# Patient Record
Sex: MALE | Race: WHITE | NOT HISPANIC OR LATINO | Employment: OTHER | URBAN - METROPOLITAN AREA
[De-identification: names, ages, dates, MRNs, and addresses within clinical notes are randomized per-mention and may not be internally consistent; named-entity substitution may affect disease eponyms.]

---

## 2017-10-10 ENCOUNTER — TRANSCRIBE ORDERS (OUTPATIENT)
Dept: ADMINISTRATIVE | Facility: HOSPITAL | Age: 77
End: 2017-10-10

## 2017-10-10 DIAGNOSIS — I27.20 PULMONARY HYPERTENSION (HCC): Primary | ICD-10-CM

## 2017-10-12 ENCOUNTER — HOSPITAL ENCOUNTER (OUTPATIENT)
Dept: CT IMAGING | Facility: HOSPITAL | Age: 77
Discharge: HOME | End: 2017-10-12
Attending: INTERNAL MEDICINE | Admitting: INTERNAL MEDICINE

## 2017-10-12 DIAGNOSIS — I27.20 PULMONARY HYPERTENSION (HCC): ICD-10-CM

## 2017-10-12 PROCEDURE — 71250 CT THORAX DX C-: CPT

## 2018-07-03 ENCOUNTER — TRANSCRIBE ORDERS (OUTPATIENT)
Dept: ADMINISTRATIVE | Facility: HOSPITAL | Age: 78
End: 2018-07-03

## 2018-07-03 DIAGNOSIS — D72.10 EOSINOPHILIA: Primary | ICD-10-CM

## 2018-07-06 ENCOUNTER — HOSPITAL ENCOUNTER (OUTPATIENT)
Dept: CT IMAGING | Facility: HOSPITAL | Age: 78
Discharge: HOME | End: 2018-07-06
Attending: INTERNAL MEDICINE | Admitting: INTERNAL MEDICINE

## 2018-07-06 DIAGNOSIS — D72.10 EOSINOPHILIA: ICD-10-CM

## 2018-07-06 LAB — CREAT BLDA-MCNC: 0.9 MG/DL (ref 0.6–1.3)

## 2018-07-06 PROCEDURE — 82565 ASSAY OF CREATININE: CPT

## 2018-07-06 PROCEDURE — 71260 CT THORAX DX C+: CPT

## 2018-07-06 PROCEDURE — 74177 CT ABD & PELVIS W/CONTRAST: CPT

## 2018-07-06 PROCEDURE — 25010000002 IOPAMIDOL 61 % SOLUTION: Performed by: INTERNAL MEDICINE

## 2018-07-06 RX ADMIN — IOPAMIDOL 85 ML: 612 INJECTION, SOLUTION INTRAVENOUS at 11:45

## 2018-07-17 RX ORDER — SUCRALFATE ORAL 1 G/10ML
1 SUSPENSION ORAL 4 TIMES DAILY
Qty: 420 ML | Refills: 1 | Status: SHIPPED | OUTPATIENT
Start: 2018-07-17 | End: 2019-06-06 | Stop reason: SDUPTHER

## 2018-08-13 ENCOUNTER — HOSPITAL ENCOUNTER (EMERGENCY)
Facility: HOSPITAL | Age: 78
Discharge: HOME | End: 2018-08-13
Attending: EMERGENCY MEDICINE | Admitting: EMERGENCY MEDICINE

## 2018-08-13 ENCOUNTER — APPOINTMENT (OUTPATIENT)
Dept: GENERAL RADIOLOGY | Facility: HOSPITAL | Age: 78
End: 2018-08-13

## 2018-08-13 VITALS
SYSTOLIC BLOOD PRESSURE: 141 MMHG | OXYGEN SATURATION: 99 % | DIASTOLIC BLOOD PRESSURE: 81 MMHG | RESPIRATION RATE: 18 BRPM | TEMPERATURE: 98.7 F | WEIGHT: 165 LBS | HEIGHT: 69 IN | HEART RATE: 74 BPM | BODY MASS INDEX: 24.44 KG/M2

## 2018-08-13 DIAGNOSIS — N28.9 ACUTE RENAL INSUFFICIENCY: ICD-10-CM

## 2018-08-13 DIAGNOSIS — J18.9 PNEUMONIA OF LEFT LOWER LOBE DUE TO INFECTIOUS ORGANISM: Primary | ICD-10-CM

## 2018-08-13 LAB
ALBUMIN SERPL-MCNC: 3.8 G/DL (ref 3.5–5.2)
ALBUMIN/GLOB SERPL: 1.4 G/DL
ALP SERPL-CCNC: 50 U/L (ref 39–117)
ALT SERPL W P-5'-P-CCNC: 27 U/L (ref 1–41)
ANION GAP SERPL CALCULATED.3IONS-SCNC: 9.3 MMOL/L
AST SERPL-CCNC: 23 U/L (ref 1–40)
BACTERIA UR QL AUTO: ABNORMAL /HPF
BASOPHILS # BLD AUTO: 0.06 10*3/MM3 (ref 0–0.2)
BASOPHILS NFR BLD AUTO: 0.5 % (ref 0–1.5)
BILIRUB SERPL-MCNC: 0.4 MG/DL (ref 0.1–1.2)
BILIRUB UR QL STRIP: NEGATIVE
BUN BLD-MCNC: 15 MG/DL (ref 8–23)
BUN/CREAT SERPL: 10.1 (ref 7–25)
CALCIUM SPEC-SCNC: 8.6 MG/DL (ref 8.6–10.5)
CHLORIDE SERPL-SCNC: 99 MMOL/L (ref 98–107)
CLARITY UR: CLEAR
CO2 SERPL-SCNC: 28.7 MMOL/L (ref 22–29)
COLOR UR: YELLOW
CREAT BLD-MCNC: 1.48 MG/DL (ref 0.76–1.27)
D-LACTATE SERPL-SCNC: 1.2 MMOL/L (ref 0.5–2)
DEPRECATED RDW RBC AUTO: 55.2 FL (ref 37–54)
EOSINOPHIL # BLD AUTO: 0.78 10*3/MM3 (ref 0–0.7)
EOSINOPHIL NFR BLD AUTO: 6.9 % (ref 0.3–6.2)
ERYTHROCYTE [DISTWIDTH] IN BLOOD BY AUTOMATED COUNT: 15.6 % (ref 11.5–14.5)
GFR SERPL CREATININE-BSD FRML MDRD: 46 ML/MIN/1.73
GLOBULIN UR ELPH-MCNC: 2.7 GM/DL
GLUCOSE BLD-MCNC: 102 MG/DL (ref 65–99)
GLUCOSE UR STRIP-MCNC: NEGATIVE MG/DL
HCT VFR BLD AUTO: 41.2 % (ref 40.4–52.2)
HGB BLD-MCNC: 13.1 G/DL (ref 13.7–17.6)
HGB UR QL STRIP.AUTO: NEGATIVE
HOLD SPECIMEN: NORMAL
HOLD SPECIMEN: NORMAL
HYALINE CASTS UR QL AUTO: ABNORMAL /LPF
IMM GRANULOCYTES # BLD: 0.03 10*3/MM3 (ref 0–0.03)
IMM GRANULOCYTES NFR BLD: 0.3 % (ref 0–0.5)
KETONES UR QL STRIP: NEGATIVE
LEUKOCYTE ESTERASE UR QL STRIP.AUTO: ABNORMAL
LYMPHOCYTES # BLD AUTO: 1.55 10*3/MM3 (ref 0.9–4.8)
LYMPHOCYTES NFR BLD AUTO: 13.8 % (ref 19.6–45.3)
MCH RBC QN AUTO: 30.4 PG (ref 27–32.7)
MCHC RBC AUTO-ENTMCNC: 31.8 G/DL (ref 32.6–36.4)
MCV RBC AUTO: 95.6 FL (ref 79.8–96.2)
MONOCYTES # BLD AUTO: 0.57 10*3/MM3 (ref 0.2–1.2)
MONOCYTES NFR BLD AUTO: 5.1 % (ref 5–12)
NEUTROPHILS # BLD AUTO: 8.31 10*3/MM3 (ref 1.9–8.1)
NEUTROPHILS NFR BLD AUTO: 73.7 % (ref 42.7–76)
NITRITE UR QL STRIP: NEGATIVE
NRBC BLD MANUAL-RTO: 0 /100 WBC (ref 0–0)
NT-PROBNP SERPL-MCNC: 320.5 PG/ML (ref 0–1800)
PH UR STRIP.AUTO: <=5 [PH] (ref 5–8)
PLATELET # BLD AUTO: 305 10*3/MM3 (ref 140–500)
PMV BLD AUTO: 10.8 FL (ref 6–12)
POTASSIUM BLD-SCNC: 4 MMOL/L (ref 3.5–5.2)
PROT SERPL-MCNC: 6.5 G/DL (ref 6–8.5)
PROT UR QL STRIP: NEGATIVE
RBC # BLD AUTO: 4.31 10*6/MM3 (ref 4.6–6)
RBC # UR: ABNORMAL /HPF
REF LAB TEST METHOD: ABNORMAL
SODIUM BLD-SCNC: 137 MMOL/L (ref 136–145)
SP GR UR STRIP: 1.01 (ref 1–1.03)
SQUAMOUS #/AREA URNS HPF: ABNORMAL /HPF
TROPONIN T SERPL-MCNC: <0.01 NG/ML (ref 0–0.03)
UROBILINOGEN UR QL STRIP: ABNORMAL
WBC NRBC COR # BLD: 11.27 10*3/MM3 (ref 4.5–10.7)
WBC UR QL AUTO: ABNORMAL /HPF
WHOLE BLOOD HOLD SPECIMEN: NORMAL
WHOLE BLOOD HOLD SPECIMEN: NORMAL

## 2018-08-13 PROCEDURE — 84484 ASSAY OF TROPONIN QUANT: CPT | Performed by: PHYSICIAN ASSISTANT

## 2018-08-13 PROCEDURE — 80053 COMPREHEN METABOLIC PANEL: CPT | Performed by: PHYSICIAN ASSISTANT

## 2018-08-13 PROCEDURE — 93010 ELECTROCARDIOGRAM REPORT: CPT | Performed by: INTERNAL MEDICINE

## 2018-08-13 PROCEDURE — 71046 X-RAY EXAM CHEST 2 VIEWS: CPT

## 2018-08-13 PROCEDURE — 99285 EMERGENCY DEPT VISIT HI MDM: CPT

## 2018-08-13 PROCEDURE — 83605 ASSAY OF LACTIC ACID: CPT | Performed by: PHYSICIAN ASSISTANT

## 2018-08-13 PROCEDURE — 81001 URINALYSIS AUTO W/SCOPE: CPT | Performed by: PHYSICIAN ASSISTANT

## 2018-08-13 PROCEDURE — 85025 COMPLETE CBC W/AUTO DIFF WBC: CPT | Performed by: PHYSICIAN ASSISTANT

## 2018-08-13 PROCEDURE — 83880 ASSAY OF NATRIURETIC PEPTIDE: CPT | Performed by: PHYSICIAN ASSISTANT

## 2018-08-13 PROCEDURE — 93005 ELECTROCARDIOGRAM TRACING: CPT

## 2018-08-13 PROCEDURE — 93005 ELECTROCARDIOGRAM TRACING: CPT | Performed by: EMERGENCY MEDICINE

## 2018-08-13 PROCEDURE — 96360 HYDRATION IV INFUSION INIT: CPT

## 2018-08-13 RX ORDER — LEVOFLOXACIN 750 MG/1
750 TABLET ORAL ONCE
Status: COMPLETED | OUTPATIENT
Start: 2018-08-13 | End: 2018-08-13

## 2018-08-13 RX ORDER — LEVOFLOXACIN 500 MG/1
500 TABLET, FILM COATED ORAL DAILY
Qty: 6 TABLET | Refills: 0 | Status: SHIPPED | OUTPATIENT
Start: 2018-08-13 | End: 2022-05-16

## 2018-08-13 RX ADMIN — SODIUM CHLORIDE 1000 ML: 9 INJECTION, SOLUTION INTRAVENOUS at 20:57

## 2018-08-13 RX ADMIN — LEVOFLOXACIN 750 MG: 750 TABLET, FILM COATED ORAL at 20:59

## 2018-08-13 NOTE — ED TRIAGE NOTES
Pt arrives with c/o SOA, CP, that occurred once this afternoon. Pt is not currently complaining of pain at this time.

## 2018-08-13 NOTE — ED PROVIDER NOTES
EMERGENCY DEPARTMENT ENCOUNTER    Room Number:  25/25  Date seen:  8/13/2018  Time seen: 6:19 PM  PCP: Willy Almeida MD    HPI:  Chief complaint: feeling unwell  Context:Willy Saenz is a 78 y.o. male who presents to the ED with c/o 1 week hx of productive cough and feeling unwell. Today had an episode while at rest where he felt drained, clammy, and nauseated. Denies chest pain, abdominal pain, fevers, lightheadedness, urinary symptoms.    Onset: gradual  Location: generalized  Radiation: none  Duration: 1 week  Timing: intermittent  Character: fatigue  Aggravating Factors: none  Alleviating Factors: none  Severity: moderate to severe      ALLERGIES  Dust mite extract and Grass    PAST MEDICAL HISTORY  Active Ambulatory Problems     Diagnosis Date Noted   • No Active Ambulatory Problems     Resolved Ambulatory Problems     Diagnosis Date Noted   • No Resolved Ambulatory Problems     Past Medical History:   Diagnosis Date   • Back pain    • Depression    • Disease of thyroid gland    • Hyperlipidemia    • Prostate cancer (CMS/HCC)        PAST SURGICAL HISTORY  Past Surgical History:   Procedure Laterality Date   • PROSTATECTOMY         FAMILY HISTORY  History reviewed. No pertinent family history.    SOCIAL HISTORY  Social History     Social History   • Marital status:      Spouse name: N/A   • Number of children: N/A   • Years of education: N/A     Occupational History   • Not on file.     Social History Main Topics   • Smoking status: Former Smoker   • Smokeless tobacco: Not on file   • Alcohol use Yes      Comment: 2 drinks daily   • Drug use: No   • Sexual activity: Defer     Other Topics Concern   • Not on file     Social History Narrative   • No narrative on file       REVIEW OF SYSTEMS  Review of Systems   Constitutional: Negative for chills and fever.   HENT: Negative.    Eyes: Negative.    Respiratory: Positive for cough. Negative for shortness of breath.    Cardiovascular: Negative for  chest pain, palpitations and leg swelling.   Gastrointestinal: Positive for nausea. Negative for abdominal pain, diarrhea and vomiting.   Genitourinary: Negative.  Negative for dysuria and hematuria.   Musculoskeletal: Positive for myalgias.   Skin: Negative.    Neurological: Negative.    Psychiatric/Behavioral: Negative.        PHYSICAL EXAM  ED Triage Vitals   Temp Heart Rate Resp BP SpO2   08/13/18 1703 08/13/18 1703 08/13/18 1801 08/13/18 1703 08/13/18 1703   98.7 °F (37.1 °C) 88 16 136/88 100 %      Temp src Heart Rate Source Patient Position BP Location FiO2 (%)   08/13/18 1703 -- -- -- --   Tympanic         Physical Exam   Constitutional: He is oriented to person, place, and time and well-developed, well-nourished, and in no distress.   HENT:   Head: Normocephalic and atraumatic.   Right Ear: External ear normal.   Left Ear: External ear normal.   Nose: Nose normal.   Mouth/Throat: Oropharynx is clear and moist.   Eyes: Conjunctivae are normal.   Neck: Normal range of motion.   Cardiovascular: Normal rate and regular rhythm.    Pulmonary/Chest: Effort normal and breath sounds normal.   Abdominal: Soft. He exhibits no distension. There is no tenderness.   Musculoskeletal: Normal range of motion.   Neurological: He is alert and oriented to person, place, and time.   Skin: Skin is warm and dry.   Psychiatric: Affect normal.   Nursing note and vitals reviewed.      LAB RESULTS  Recent Results (from the past 24 hour(s))   Light Blue Top    Collection Time: 08/13/18  6:13 PM   Result Value Ref Range    Extra Tube hold for add-on    Green Top (Gel)    Collection Time: 08/13/18  6:13 PM   Result Value Ref Range    Extra Tube Hold for add-ons.    Lavender Top    Collection Time: 08/13/18  6:13 PM   Result Value Ref Range    Extra Tube hold for add-on    Gold Top - SST    Collection Time: 08/13/18  6:13 PM   Result Value Ref Range    Extra Tube Hold for add-ons.    Comprehensive Metabolic Panel    Collection Time:  08/13/18  6:13 PM   Result Value Ref Range    Glucose 102 (H) 65 - 99 mg/dL    BUN 15 8 - 23 mg/dL    Creatinine 1.48 (H) 0.76 - 1.27 mg/dL    Sodium 137 136 - 145 mmol/L    Potassium 4.0 3.5 - 5.2 mmol/L    Chloride 99 98 - 107 mmol/L    CO2 28.7 22.0 - 29.0 mmol/L    Calcium 8.6 8.6 - 10.5 mg/dL    Total Protein 6.5 6.0 - 8.5 g/dL    Albumin 3.80 3.50 - 5.20 g/dL    ALT (SGPT) 27 1 - 41 U/L    AST (SGOT) 23 1 - 40 U/L    Alkaline Phosphatase 50 39 - 117 U/L    Total Bilirubin 0.4 0.1 - 1.2 mg/dL    eGFR Non African Amer 46 (L) >60 mL/min/1.73    Globulin 2.7 gm/dL    A/G Ratio 1.4 g/dL    BUN/Creatinine Ratio 10.1 7.0 - 25.0    Anion Gap 9.3 mmol/L   Troponin    Collection Time: 08/13/18  6:13 PM   Result Value Ref Range    Troponin T <0.010 0.000 - 0.030 ng/mL   CBC Auto Differential    Collection Time: 08/13/18  6:13 PM   Result Value Ref Range    WBC 11.27 (H) 4.50 - 10.70 10*3/mm3    RBC 4.31 (L) 4.60 - 6.00 10*6/mm3    Hemoglobin 13.1 (L) 13.7 - 17.6 g/dL    Hematocrit 41.2 40.4 - 52.2 %    MCV 95.6 79.8 - 96.2 fL    MCH 30.4 27.0 - 32.7 pg    MCHC 31.8 (L) 32.6 - 36.4 g/dL    RDW 15.6 (H) 11.5 - 14.5 %    RDW-SD 55.2 (H) 37.0 - 54.0 fl    MPV 10.8 6.0 - 12.0 fL    Platelets 305 140 - 500 10*3/mm3    Neutrophil % 73.7 42.7 - 76.0 %    Lymphocyte % 13.8 (L) 19.6 - 45.3 %    Monocyte % 5.1 5.0 - 12.0 %    Eosinophil % 6.9 (H) 0.3 - 6.2 %    Basophil % 0.5 0.0 - 1.5 %    Immature Grans % 0.3 0.0 - 0.5 %    Neutrophils, Absolute 8.31 (H) 1.90 - 8.10 10*3/mm3    Lymphocytes, Absolute 1.55 0.90 - 4.80 10*3/mm3    Monocytes, Absolute 0.57 0.20 - 1.20 10*3/mm3    Eosinophils, Absolute 0.78 (H) 0.00 - 0.70 10*3/mm3    Basophils, Absolute 0.06 0.00 - 0.20 10*3/mm3    Immature Grans, Absolute 0.03 0.00 - 0.03 10*3/mm3    nRBC 0.0 0.0 - 0.0 /100 WBC   BNP    Collection Time: 08/13/18  6:13 PM   Result Value Ref Range    proBNP 320.5 0.0-1,800.0 pg/mL   Urinalysis With Microscopic If Indicated (No Culture) - Urine, Clean  "Catch    Collection Time: 08/13/18  7:34 PM   Result Value Ref Range    Color, UA Yellow Yellow, Straw    Appearance, UA Clear Clear    pH, UA <=5.0 5.0 - 8.0    Specific Gravity, UA 1.013 1.005 - 1.030    Glucose, UA Negative Negative    Ketones, UA Negative Negative    Bilirubin, UA Negative Negative    Blood, UA Negative Negative    Protein, UA Negative Negative    Leuk Esterase, UA Trace (A) Negative    Nitrite, UA Negative Negative    Urobilinogen, UA 1.0 E.U./dL 0.2 - 1.0 E.U./dL   Lactic Acid, Plasma    Collection Time: 08/13/18  7:34 PM   Result Value Ref Range    Lactate 1.2 0.5 - 2.0 mmol/L   Urinalysis, Microscopic Only - Urine, Clean Catch    Collection Time: 08/13/18  7:34 PM   Result Value Ref Range    RBC, UA 0-2 None Seen, 0-2 /HPF    WBC, UA 6-12 (A) None Seen, 0-2 /HPF    Bacteria, UA None Seen None Seen /HPF    Squamous Epithelial Cells, UA 0-2 None Seen, 0-2 /HPF    Hyaline Casts, UA 3-6 None Seen /LPF    Methodology Manual Light Microscopy        I ordered the above labs and reviewed the results    RADIOLOGY  XR Chest 2 View   Final Result        CXR showed LLL infiltrate      MEDICATIONS GIVEN IN ER  Medications   sodium chloride 0.9 % bolus 1,000 mL (0 mL Intravenous Stopped 8/13/18 2204)   levoFLOXacin (LEVAQUIN) tablet 750 mg (750 mg Oral Given 8/13/18 2059)       EKG  Interpreted by ED Physician    PROCEDURES  Procedures      PROGRESS AND CONSULTS    Progress Notes:        2028 Reviewed pt's history and workup with Dr. Victoria.  After a bedside evaluation; Dr Victoria agrees with the plan of care and is taking over course of care of the patient at this time.      Disposition vitals:  /81   Pulse 74   Temp 98.7 °F (37.1 °C) (Tympanic)   Resp 18   Ht 175.3 cm (69\")   Wt 74.8 kg (165 lb)   SpO2 99%   BMI 24.37 kg/m²       DIAGNOSIS  Final diagnoses:   Pneumonia of left lower lobe due to infectious organism (CMS/HCC)   Acute renal insufficiency       FOLLOW UP   Willy Almeida " MD Coco  [address]  [facility] [address]  [phone]    Go in 1 day        RX     Medication List      New Prescriptions    levoFLOXacin 500 MG tablet  Commonly known as:  LEVAQUIN  Take 1 tablet by mouth Daily.             Britney Houser PA  08/13/18 [id number]

## 2018-08-14 NOTE — DISCHARGE INSTRUCTIONS
Drink plenty fluids.  Start taking Levaquin tomorrow.  Follow-up with Dr. Almeida as scheduled.  Return to emergency department for worsening shortness of breath, fever, or other concern.

## 2018-08-14 NOTE — ED PROVIDER NOTES
"Pt presents to the ED c/o productive cough and fatigue for 1 week. Today pt had an episode where he felt nauseated, \"clammy\", and weak. Pt c/o SOA, dizziness, and decreased appetite. Pt denies CP, abd pain, fever, vomiting, diarrhea. Pt reports his BP at PCP office this morning was low. Pt started Amoxicillin 1 week ago.   On exam, pt is well appearing.  Heart and lungs are normal  Abd is normal  Neuro exam is normal    EKG           EKG time: 1707  Rhythm/Rate: NSR 62  P waves and FL: normal  QRS, axis: normal axis, RSR' in VI   ST and T waves: normal     Interpreted Contemporaneously by me, independently viewed  unchanged compared to prior 5/7/16     Progress and Consults  8:29 PM  Labs reviewed- WBC 11.27  CXR showed LLL infiltrate    8:34 PM  Informed pt of CXR which shows pneumonia in L lower lung and plan to discuss with PCP.     8:41 PM  Consult placed to PCP and IV fluids ordered for dehydration.     8:46 PM  Discussed pt case with Dr. Almeida, PCP. He agrees with plan to give IV fluids and treat with Levaquin. He will have office f/u with pt tomorrow.   Levaquin ordered for pneumonia.     10:11 PM  Rechecked pt who is resting in NAD. Informed pt of plan to discharge with rx for Levaquin. Advised pt f/u with Dr. Almeida. Pt understands and agrees with the plan, all questions answered. RTER instructions given.     ED Course as of Aug 13 2217   Mon Aug 13, 2018   2036 0.9 one month ago Creatinine: (!) 1.48 [WH]   2214 Test results were discussed with the patient and his wife.  I also informed him that I spoke with Dr. Almeida at the plan is to discharge him with prescription for Levaquin.  He already has an appointment with Dr. Almeida tomorrow morning.  Patient was resting comfortably.  O2 sats were 99% on room air.  Blood pressure was 141/81.  [WH]      ED Course User Index  [WH] Braxton Victoria MD       Final diagnoses:   Pneumonia of left lower lobe due to infectious organism (CMS/HCC)   Acute renal " insufficiency     DISCHARGE    Patient discharged in stable condition.    Reviewed implications of results, diagnosis, meds, responsibility to follow up, warning signs and symptoms of possible worsening, potential complications and reasons to return to ER.    Patient/Family voiced understanding of above instructions.    Discussed plan for discharge, as there is no emergent indication for admission. Patient referred to primary care provider for BP management due to today's BP. Pt/family is agreeable and understands need for follow up and repeat testing.  Pt is aware that discharge does not mean that nothing is wrong but it indicates no emergency is present that requires admission and they must continue care with follow-up as given below or physician of their choice.     FOLLOW-UP  Willy Almeida MD  [address] [id number] [phone]    Go in 1 day           Medication List      New Prescriptions    levoFLOXacin 500 MG tablet  Commonly known as:  LEVAQUIN  Take 1 tablet by mouth Daily.            Attestation:  The ARIES and I have discussed this patient's history, physical exam, and treatment plan.  I have reviewed the documentation and personally had a face to face interaction with the patient. I affirm the documentation and agree with the treatment and plan.  The attached note describes my personal findings.      Documentation assistance provided by leanne Orlando for Dr. Victoria Information recorded by the leanne was done at my direction and has been verified and validated by me.       Janett Orlando  08/13/18 [id number]       Braxton Victoria MD  08/14/18 0001

## 2018-09-17 ENCOUNTER — TRANSCRIBE ORDERS (OUTPATIENT)
Dept: ADMINISTRATIVE | Facility: HOSPITAL | Age: 78
End: 2018-09-17

## 2018-09-17 DIAGNOSIS — R53.83 FATIGUE, UNSPECIFIED TYPE: ICD-10-CM

## 2018-09-17 DIAGNOSIS — R41.82 ALTERED MENTAL STATUS, UNSPECIFIED ALTERED MENTAL STATUS TYPE: Primary | ICD-10-CM

## 2018-09-17 DIAGNOSIS — R42 DIZZINESS: ICD-10-CM

## 2019-06-06 ENCOUNTER — OFFICE VISIT (OUTPATIENT)
Dept: NEUROLOGY | Facility: CLINIC | Age: 79
End: 2019-06-06

## 2019-06-06 VITALS
WEIGHT: 174.3 LBS | SYSTOLIC BLOOD PRESSURE: 120 MMHG | HEART RATE: 62 BPM | OXYGEN SATURATION: 96 % | BODY MASS INDEX: 25.82 KG/M2 | HEIGHT: 69 IN | DIASTOLIC BLOOD PRESSURE: 66 MMHG

## 2019-06-06 DIAGNOSIS — R90.89 ABNORMAL FINDING ON MRI OF BRAIN: Primary | ICD-10-CM

## 2019-06-06 PROCEDURE — 99203 OFFICE O/P NEW LOW 30 MIN: CPT | Performed by: PSYCHIATRY & NEUROLOGY

## 2019-06-06 RX ORDER — ALPRAZOLAM 0.5 MG/1
TABLET ORAL
COMMUNITY

## 2019-06-06 RX ORDER — TRAMADOL HYDROCHLORIDE 50 MG/1
TABLET ORAL
COMMUNITY

## 2019-06-06 RX ORDER — CARVEDILOL 6.25 MG/1
6.25 TABLET ORAL 2 TIMES DAILY
Refills: 2 | COMMUNITY
Start: 2019-04-15

## 2019-06-06 RX ORDER — TRIAMCINOLONE ACETONIDE 0.25 MG/G
OINTMENT TOPICAL
Status: ON HOLD | COMMUNITY
End: 2022-07-24

## 2019-06-06 RX ORDER — TESTOSTERONE CYPIONATE 200 MG/ML
VIAL (ML) INTRAMUSCULAR
COMMUNITY

## 2019-06-06 NOTE — PROGRESS NOTES
"Willy Saenz is a 79 y.o. male presents for   Chief Complaint   Patient presents with   • Neurologic Problem                CHIEF COMPLAINT:  Abnormal Brain MRI      History of Present Illness  A pleasant 79 YOM, right-handed, comes unaccompanied who ha sno Neurological compalints  He filled out the history Neurology sheet with the only complaint \"appetite change\"  Back In August-Sepetmebr 2018 he was evaluated for fatigue and chronic fatigue syndrome was considered. He was also have non-vertigo dizziness. DR Almeida had sent him a addy MRI that was done at [facility] in September 2018.   Dio went to [facility] where he was evaluated and was felt not to have neurological issues and no chronic fatigue.  He has since recovered.          The following portions of the patient's history were reviewed and updated as appropriate: allergies, current medications, past family history, past medical history, past social history, past surgical history and problem list.  I compleetd ROS, PH, SH, FH, medications and allergies      Review of Systems   Constitutional: Positive for appetite change. Negative for fatigue and fever.   HENT: Negative for hearing loss, tinnitus and trouble swallowing.    Eyes: Negative for pain, redness and itching.   Respiratory: Negative for cough, shortness of breath and wheezing.    Cardiovascular: Negative for chest pain, palpitations and leg swelling.   Gastrointestinal: Negative for constipation, diarrhea, nausea and vomiting.   Endocrine: Negative for cold intolerance, heat intolerance and polydipsia.   Genitourinary: Negative for decreased urine volume, difficulty urinating and urgency.   Musculoskeletal: Negative for back pain, neck pain and neck stiffness.   Skin: Negative for color change, rash and wound.   Allergic/Immunologic: Negative for environmental allergies, food allergies and immunocompromised state.   Neurological: Negative for dizziness, tremors, seizures, syncope, " facial asymmetry, speech difficulty, weakness, light-headedness, numbness and headaches.   Hematological: Negative for adenopathy. Does not bruise/bleed easily.   Psychiatric/Behavioral: Negative for agitation, behavioral problems, confusion, decreased concentration, dysphoric mood, hallucinations, self-injury, sleep disturbance and suicidal ideas. The patient is nervous/anxious. The patient is not hyperactive.          Past Medical History:   Diagnosis Date   • Anxiety    • Arthritis    • Back pain    • Depression    • Disease of thyroid gland    • Environmental allergies    • Hyperlipidemia    • Prostate cancer (CMS/[facility])          Social History     Socioeconomic History   • Marital status:      Spouse name: Not on file   • Number of children: Not on file   • Years of education: Not on file   • Highest education level: Not on file   Tobacco Use   • Smoking status: Former Smoker     Start date:      Last attempt to quit: 1970     Years since quittin.4   • Smokeless tobacco: Never Used   Substance and Sexual Activity   • Alcohol use: Yes     Comment: 2 drinks daily   • Drug use: No   • Sexual activity: Defer          Family History   Problem Relation Age of Onset   • No Known Problems Mother    • Arthritis Father    • Cancer Father    • Heart disease Father    • Parkinsonism Father    • No Known Problems Maternal Grandmother    • No Known Problems Maternal Grandfather    • No Known Problems Paternal Grandmother    • No Known Problems Paternal Grandfather            Current Outpatient Medications:   •  ALPRAZolam (XANAX) 0.5 MG tablet, alprazolam 0.5 mg tablet  TAKE 1 TABLET EVERY DAY FOR ANXIETY ATTACK, Disp: , Rfl:   •  aspirin 325 MG tablet, Take 325 mg by mouth daily., Disp: , Rfl:   •  carvedilol (COREG) 6.25 MG tablet, Take 6.25 mg by mouth 2 (Two) Times a Day., Disp: , Rfl: 2  •  citalopram (CELEXA) 20 MG tablet, Take  by mouth., Disp: , Rfl:   •  ezetimibe (ZETIA) 10 MG tablet, Take 10 mg by  mouth daily., Disp: , Rfl:   •  levocetirizine (XYZAL) 5 MG tablet, Take 5 mg by mouth every evening., Disp: , Rfl:   •  levothyroxine (SYNTHROID, LEVOTHROID) 50 MCG tablet, Take  by mouth., Disp: , Rfl:   •  OMEPRAZOLE PO, omeprazole 20 mg-clarithromycin 500 mg-amoxicill 500 mg(40) combo pack  Take by oral route., Disp: , Rfl:   •  rosuvastatin (CRESTOR) 40 MG tablet, Take 40 mg by mouth Daily., Disp: , Rfl:   •  Testosterone Cypionate 200 MG/ML solution, testosterone cypionate 200 mg/mL intramuscular oil, Disp: , Rfl:   •  traMADol (ULTRAM) 50 MG tablet, tramadol 50 mg tablet  Take 2 tablets 3 times a day by oral route., Disp: , Rfl:   •  triamcinolone (KENALOG) 0.025 % ointment, triamcinolone acetonide 0.025 % topical ointment, Disp: , Rfl:   •  Beclomethasone Diprop, Nasal, (QNASL NA), into each nostril., Disp: , Rfl:   •  carbamide peroxide (DEBROX) 6.5 % otic solution, Debrox 6.5 % ear drops  INSTILL 5 DROPS INTO AFFECTED EAR(S) BY OTIC ROUTE 2 TIMES PER DAY, Disp: , Rfl:   •  diclofenac (VOLTAREN) 1 % gel gel, diclofenac 1 % topical gel, Disp: , Rfl:   •  levoFLOXacin (LEVAQUIN) 500 MG tablet, Take 1 tablet by mouth Daily., Disp: 6 tablet, Rfl: 0      Vitals:    06/06/19 0904   BP: 120/66   Pulse: 62   SpO2: 96%         Physical Exam   Constitutional: He is oriented to person, place, and time. He appears well-developed and well-nourished. No distress.   Neurological: He is oriented to person, place, and time. He has normal strength. He has a normal Finger-Nose-Finger Test, a normal Heel to Shin Test, a normal Romberg Test and a normal Tandem Gait Test. Gait normal.   Skin: He is not diaphoretic.   Psychiatric: He has a normal mood and affect. His speech is normal and behavior is normal. Judgment and thought content normal.   Nursing note and vitals reviewed.        Neurologic Exam     Mental Status   Oriented to person, place, and time.   Attention: normal. Concentration: normal.   Speech: speech is normal    Level of consciousness: alert  Knowledge: good.     Cranial Nerves   Cranial nerves II through XII intact.     Motor Exam   Muscle bulk: normal  Overall muscle tone: normal  Right arm tone: normal  Left arm tone: normal  Right arm pronator drift: absent  Left arm pronator drift: absent  Right leg tone: normal  Left leg tone: normal    Strength   Strength 5/5 throughout.     Sensory Exam   Graphesthesia: normal  Stereognosis: normal    Gait, Coordination, and Reflexes     Gait  Gait: normal    Coordination   Romberg: negative  Finger to nose coordination: normal  Heel to shin coordination: normal  Tandem walking coordination: normal    Tremor   Resting tremor: absent  Intention tremor: absent  Action tremor: absent    Reflexes   Right Roblero: absent  Left Roblero: absent  Right ankle clonus: absent  Left ankle clonus: absent  Right pendular knee jerk: absent  Left pendular knee jerk: absent          No visits with results within 2 Month(s) from this visit.   Latest known visit with results is:   Admission on 08/13/2018, Discharged on 08/13/2018   Component Date Value Ref Range Status   • Extra Tube 08/13/2018 hold for add-on   Final    Auto resulted   • Extra Tube 08/13/2018 Hold for add-ons.   Final    Auto resulted.   • Extra Tube 08/13/2018 hold for add-on   Final    Auto resulted   • Extra Tube 08/13/2018 Hold for add-ons.   Final    Auto resulted.   • Glucose 08/13/2018 102* 65 - 99 mg/dL Final   • BUN 08/13/2018 15  8 - 23 mg/dL Final   • Creatinine 08/13/2018 1.48* 0.76 - 1.27 mg/dL Final   • Sodium 08/13/2018 137  136 - 145 mmol/L Final   • Potassium 08/13/2018 4.0  3.5 - 5.2 mmol/L Final   • Chloride 08/13/2018 99  98 - 107 mmol/L Final   • CO2 08/13/2018 28.7  22.0 - 29.0 mmol/L Final   • Calcium 08/13/2018 8.6  8.6 - 10.5 mg/dL Final   • Total Protein 08/13/2018 6.5  6.0 - 8.5 g/dL Final   • Albumin 08/13/2018 3.80  3.50 - 5.20 g/dL Final   • ALT (SGPT) 08/13/2018 27  1 - 41 U/L Final   • AST (SGOT)  08/13/2018 23  1 - 40 U/L Final   • Alkaline Phosphatase 08/13/2018 50  39 - 117 U/L Final   • Total Bilirubin 08/13/2018 0.4  0.1 - 1.2 mg/dL Final   • eGFR Non African Amer 08/13/2018 46* >60 mL/min/1.73 Final   • Globulin 08/13/2018 2.7  gm/dL Final   • A/G Ratio 08/13/2018 1.4  g/dL Final   • BUN/Creatinine Ratio 08/13/2018 10.1  7.0 - 25.0 Final   • Anion Gap 08/13/2018 9.3  mmol/L Final   • Color, UA 08/13/2018 Yellow  Yellow, Straw Final   • Appearance, UA 08/13/2018 Clear  Clear Final   • pH, UA 08/13/2018 <=5.0  5.0 - 8.0 Final   • Specific Gravity, UA 08/13/2018 1.013  1.005 - 1.030 Final   • Glucose, UA 08/13/2018 Negative  Negative Final   • Ketones, UA 08/13/2018 Negative  Negative Final   • Bilirubin, UA 08/13/2018 Negative  Negative Final   • Blood, UA 08/13/2018 Negative  Negative Final   • Protein, UA 08/13/2018 Negative  Negative Final   • Leuk Esterase, UA 08/13/2018 Trace* Negative Final   • Nitrite, UA 08/13/2018 Negative  Negative Final   • Urobilinogen, UA 08/13/2018 1.0 E.U./dL  0.2 - 1.0 E.U./dL Final   • Troponin T 08/13/2018 <0.010  0.000 - 0.030 ng/mL Final   • Lactate 08/13/2018 1.2  0.5 - 2.0 mmol/L Final   • WBC 08/13/2018 11.27* 4.50 - 10.70 10*3/mm3 Final   • RBC 08/13/2018 4.31* 4.60 - 6.00 10*6/mm3 Final   • Hemoglobin 08/13/2018 13.1* 13.7 - 17.6 g/dL Final   • Hematocrit 08/13/2018 41.2  40.4 - 52.2 % Final   • MCV 08/13/2018 95.6  79.8 - 96.2 fL Final   • MCH 08/13/2018 30.4  27.0 - 32.7 pg Final   • MCHC 08/13/2018 31.8* 32.6 - 36.4 g/dL Final   • RDW 08/13/2018 15.6* 11.5 - 14.5 % Final   • RDW-SD 08/13/2018 55.2* 37.0 - 54.0 fl Final   • MPV 08/13/2018 10.8  6.0 - 12.0 fL Final   • Platelets 08/13/2018 305  140 - 500 10*3/mm3 Final   • Neutrophil % 08/13/2018 73.7  42.7 - 76.0 % Final   • Lymphocyte % 08/13/2018 13.8* 19.6 - 45.3 % Final   • Monocyte % 08/13/2018 5.1  5.0 - 12.0 % Final   • Eosinophil % 08/13/2018 6.9* 0.3 - 6.2 % Final   • Basophil % 08/13/2018 0.5  0.0 -  1.5 % Final   • Immature Grans % 08/13/2018 0.3  0.0 - 0.5 % Final   • Neutrophils, Absolute 08/13/2018 8.31* 1.90 - 8.10 10*3/mm3 Final   • Lymphocytes, Absolute 08/13/2018 1.55  0.90 - 4.80 10*3/mm3 Final   • Monocytes, Absolute 08/13/2018 0.57  0.20 - 1.20 10*3/mm3 Final   • Eosinophils, Absolute 08/13/2018 0.78* 0.00 - 0.70 10*3/mm3 Final   • Basophils, Absolute 08/13/2018 0.06  0.00 - 0.20 10*3/mm3 Final   • Immature Grans, Absolute 08/13/2018 0.03  0.00 - 0.03 10*3/mm3 Final   • nRBC 08/13/2018 0.0  0.0 - 0.0 /100 WBC Final   • RBC, UA 08/13/2018 0-2  None Seen, 0-2 /HPF Final   • WBC, UA 08/13/2018 6-12* None Seen, 0-2 /HPF Final   • Bacteria, UA 08/13/2018 None Seen  None Seen /HPF Final   • Squamous Epithelial Cells, UA 08/13/2018 0-2  None Seen, 0-2 /HPF Final   • Hyaline Casts, UA 08/13/2018 3-6  None Seen /LPF Final   • Methodology 08/13/2018 Manual Light Microscopy   Final   • proBNP 08/13/2018 320.5  0.0-1,800.0 pg/mL Final            REVIEW OF STUDIES:  WE have the reprot from his Septemebr 2018 MRI  With the patient by my side we went over the report line by line and explained the report to him.  In order to put his mind at ease I called [facility] and Dr Willy Marks returned my call: the abnormlaity relative to the lowewr medulla he feels is a normal variant and there is nothing to be concerned about.      DIAGNOSIS, IMPRESSION AND PLAN:  Normal Neurological Exam     I reviewed my findings with the aptient  There is no need for further evaluation relating to his addy MRI at the prsent time.

## 2019-08-20 ENCOUNTER — TRANSCRIBE ORDERS (OUTPATIENT)
Dept: ADMINISTRATIVE | Facility: HOSPITAL | Age: 79
End: 2019-08-20

## 2019-08-20 DIAGNOSIS — N18.9 CHRONIC KIDNEY DISEASE, UNSPECIFIED CKD STAGE: Primary | ICD-10-CM

## 2019-08-29 ENCOUNTER — APPOINTMENT (OUTPATIENT)
Dept: CARDIOLOGY | Facility: HOSPITAL | Age: 79
End: 2019-08-29

## 2019-08-29 ENCOUNTER — APPOINTMENT (OUTPATIENT)
Dept: ULTRASOUND IMAGING | Facility: HOSPITAL | Age: 79
End: 2019-08-29

## 2019-09-03 ENCOUNTER — HOSPITAL ENCOUNTER (OUTPATIENT)
Dept: ULTRASOUND IMAGING | Facility: HOSPITAL | Age: 79
Discharge: HOME | End: 2019-09-03

## 2019-09-03 ENCOUNTER — HOSPITAL ENCOUNTER (OUTPATIENT)
Dept: CARDIOLOGY | Facility: HOSPITAL | Age: 79
Discharge: HOME | End: 2019-09-03
Admitting: INTERNAL MEDICINE

## 2019-09-03 DIAGNOSIS — N18.9 CHRONIC KIDNEY DISEASE, UNSPECIFIED CKD STAGE: ICD-10-CM

## 2019-09-03 LAB
BH CV ECHO MEAS - DIST REN A EDV LEFT: 18 CM/SEC
BH CV ECHO MEAS - DIST REN A PSV LEFT: 59.6 CM/SEC
BH CV ECHO MEAS - DIST REN A RI LEFT: 0.7
BH CV ECHO MEAS - HILAR A EDV LEFT: 6.7 CM/SEC
BH CV ECHO MEAS - HILAR A PSV LEFT: 22.9 CM/SEC
BH CV ECHO MEAS - HILAR A RI LEFT: 0.71
BH CV ECHO MEAS - MID REN A EDV LEFT: 16.4 CM/SEC
BH CV ECHO MEAS - MID REN A PSV LEFT: 56.6 CM/SEC
BH CV ECHO MEAS - MID REN A RI LEFT: 0.71
BH CV ECHO MEAS - PROX REN A EDV LEFT: 21 CM/SEC
BH CV ECHO MEAS - PROX REN A PSV LEFT: 75.7 CM/SEC
BH CV ECHO MEAS - PROX REN A RI LEFT: 0.72
BH CV VAS BP LEFT ARM: NORMAL MMHG
BH CV VAS BP RIGHT ARM: NORMAL MMHG
BH CV VAS RENAL AORTIC MID PSV: 53.6 CM/S
BH CV VAS RENAL HILUM LEFT EDV: 6.7 CM/S
BH CV VAS RENAL HILUM LEFT PSV: 22.9 CM/S
BH CV VAS RENAL HILUM RIGHT EDV: 13.6 CM/S
BH CV VAS RENAL HILUM RIGHT PSV: 47.1 CM/S
BH CV XLRA MEAS - KID L LEFT: 11.3 CM
BH CV XLRA MEAS - RENAL A ORG RI LEFT: 0.71
BH CV XLRA MEAS DIST REN A EDV RIGHT: 12.5 CM/S
BH CV XLRA MEAS DIST REN A PSV RIGHT: 51.6 CM/S
BH CV XLRA MEAS HILAR A EDV RIGHT: 13.6 CM/SEC
BH CV XLRA MEAS HILAR A PSV RIGHT: 47.1 CM/SEC
BH CV XLRA MEAS HILAR A RI RIGHT: 0.71
BH CV XLRA MEAS KID L RIGHT: 10.7 CM
BH CV XLRA MEAS KID W RIGHT: 5.5 CM
BH CV XLRA MEAS MID REN A EDV RIGHT: 20.8 CM/SEC
BH CV XLRA MEAS MID REN A PSV RIGHT: 78.6 CM/SEC
BH CV XLRA MEAS MID REN A RI RIGHT: 0.74
BH CV XLRA MEAS PROX REN A EDV RIGHT: 18.7 CM/SEC
BH CV XLRA MEAS PROX REN A PSV RIGHT: 72.1 CM/SEC
BH CV XLRA MEAS PROX REN A RI RIGHT: 0.74
BH CV XLRA MEAS RAR LEFT: 1.4
BH CV XLRA MEAS RAR RIGHT: 1.7
BH CV XLRA MEAS RENAL A ORG EDV LEFT: 17.4 CM/SEC
BH CV XLRA MEAS RENAL A ORG EDV RIGHT: 24.3 CM/SEC
BH CV XLRA MEAS RENAL A ORG PSV LEFT: 60.3 CM/SEC
BH CV XLRA MEAS RENAL A ORG PSV RIGHT: 90.7 CM/SEC
BH CV XLRA MEAS RENAL A ORG RI RIGHT: 0.73
LEFT KIDNEY WIDTH: 5.5 CM
LEFT RENAL UPPER PARENCHYMA MAX: 15.3 CM/S
LEFT RENAL UPPER PARENCHYMA MIN: 5.2 CM/S
LEFT RENAL UPPER PARENCHYMA RI: 0.66
RIGHT RENAL UPPER PARENCHYMA MAX: 18.3 CM/S
RIGHT RENAL UPPER PARENCHYMA MIN: 6.22 CM/S
RIGHT RENAL UPPER PARENCHYMA RI: 0.66

## 2019-09-03 PROCEDURE — 93975 VASCULAR STUDY: CPT

## 2019-09-03 PROCEDURE — 76775 US EXAM ABDO BACK WALL LIM: CPT

## 2020-06-23 ENCOUNTER — TRANSCRIBE ORDERS (OUTPATIENT)
Dept: ADMINISTRATIVE | Facility: HOSPITAL | Age: 80
End: 2020-06-23

## 2020-06-23 DIAGNOSIS — R10.9 ABDOMINAL PAIN, UNSPECIFIED ABDOMINAL LOCATION: Primary | ICD-10-CM

## 2020-06-23 DIAGNOSIS — R10.9 ABDOMINAL CRAMPING: ICD-10-CM

## 2020-06-23 DIAGNOSIS — R11.0 NAUSEA: ICD-10-CM

## 2020-06-25 ENCOUNTER — HOSPITAL ENCOUNTER (OUTPATIENT)
Dept: CT IMAGING | Facility: HOSPITAL | Age: 80
Discharge: HOME | End: 2020-06-25
Admitting: INTERNAL MEDICINE

## 2020-06-25 DIAGNOSIS — R11.0 NAUSEA: ICD-10-CM

## 2020-06-25 DIAGNOSIS — R10.9 ABDOMINAL PAIN, UNSPECIFIED ABDOMINAL LOCATION: ICD-10-CM

## 2020-06-25 DIAGNOSIS — R10.9 ABDOMINAL CRAMPING: ICD-10-CM

## 2020-06-25 PROCEDURE — 74176 CT ABD & PELVIS W/O CONTRAST: CPT

## 2020-06-25 PROCEDURE — 0 DIATRIZOATE MEGLUMINE & SODIUM PER 1 ML: Performed by: INTERNAL MEDICINE

## 2020-06-25 RX ADMIN — DIATRIZOATE MEGLUMINE AND DIATRIZOATE SODIUM 30 ML: 660; 100 LIQUID ORAL; RECTAL at 09:46

## 2020-07-09 ENCOUNTER — TRANSCRIBE ORDERS (OUTPATIENT)
Dept: SLEEP MEDICINE | Facility: HOSPITAL | Age: 80
End: 2020-07-09

## 2020-07-09 ENCOUNTER — PREP FOR SURGERY (OUTPATIENT)
Dept: OTHER | Facility: HOSPITAL | Age: 80
End: 2020-07-09

## 2020-07-09 DIAGNOSIS — Z01.818 OTHER SPECIFIED PRE-OPERATIVE EXAMINATION: Primary | ICD-10-CM

## 2020-07-09 DIAGNOSIS — R93.3 ABNORMAL CT SCAN, SIGMOID COLON: ICD-10-CM

## 2020-07-09 DIAGNOSIS — R19.7 DIARRHEA: Primary | ICD-10-CM

## 2020-07-09 DIAGNOSIS — R10.13 EPIGASTRIC PAIN: ICD-10-CM

## 2020-07-09 DIAGNOSIS — R93.0 FAMILIAL ENLARGEMENT OF THE SELLA TURCICA: ICD-10-CM

## 2020-07-09 DIAGNOSIS — R93.3 ABNORMAL VIRTUAL COLONOSCOPE: ICD-10-CM

## 2020-07-09 DIAGNOSIS — K52.9 COLITIS: ICD-10-CM

## 2020-07-10 ENCOUNTER — LAB (OUTPATIENT)
Dept: LAB | Facility: HOSPITAL | Age: 80
End: 2020-07-10

## 2020-07-10 DIAGNOSIS — Z01.818 OTHER SPECIFIED PRE-OPERATIVE EXAMINATION: ICD-10-CM

## 2020-07-10 PROCEDURE — U0004 COV-19 TEST NON-CDC HGH THRU: HCPCS

## 2020-07-10 PROCEDURE — C9803 HOPD COVID-19 SPEC COLLECT: HCPCS

## 2020-07-11 LAB
REF LAB TEST METHOD: NORMAL
SARS-COV-2 RNA RESP QL NAA+PROBE: NOT DETECTED

## 2020-07-13 ENCOUNTER — ANESTHESIA EVENT (OUTPATIENT)
Dept: GASTROENTEROLOGY | Facility: HOSPITAL | Age: 80
End: 2020-07-13

## 2020-07-13 ENCOUNTER — HOSPITAL ENCOUNTER (OUTPATIENT)
Facility: HOSPITAL | Age: 80
Setting detail: HOSPITAL OUTPATIENT SURGERY
Discharge: HOME | End: 2020-07-13
Attending: SURGERY | Admitting: SURGERY

## 2020-07-13 ENCOUNTER — ANESTHESIA (OUTPATIENT)
Dept: GASTROENTEROLOGY | Facility: HOSPITAL | Age: 80
End: 2020-07-13

## 2020-07-13 VITALS
SYSTOLIC BLOOD PRESSURE: 147 MMHG | DIASTOLIC BLOOD PRESSURE: 95 MMHG | OXYGEN SATURATION: 96 % | TEMPERATURE: 98 F | HEART RATE: 74 BPM | BODY MASS INDEX: 24.05 KG/M2 | RESPIRATION RATE: 12 BRPM | HEIGHT: 70 IN | WEIGHT: 168 LBS

## 2020-07-13 DIAGNOSIS — K52.9 COLITIS: ICD-10-CM

## 2020-07-13 DIAGNOSIS — R93.3 ABNORMAL CT SCAN, SIGMOID COLON: ICD-10-CM

## 2020-07-13 DIAGNOSIS — R19.7 DIARRHEA: ICD-10-CM

## 2020-07-13 DIAGNOSIS — R10.13 EPIGASTRIC PAIN: ICD-10-CM

## 2020-07-13 PROCEDURE — 25010000002 PHENYLEPHRINE PER 1 ML: Performed by: NURSE ANESTHETIST, CERTIFIED REGISTERED

## 2020-07-13 PROCEDURE — 88305 TISSUE EXAM BY PATHOLOGIST: CPT | Performed by: SURGERY

## 2020-07-13 PROCEDURE — 25010000002 PROPOFOL 10 MG/ML EMULSION: Performed by: NURSE ANESTHETIST, CERTIFIED REGISTERED

## 2020-07-13 RX ORDER — LIDOCAINE HYDROCHLORIDE 20 MG/ML
INJECTION, SOLUTION INFILTRATION; PERINEURAL AS NEEDED
Status: DISCONTINUED | OUTPATIENT
Start: 2020-07-13 | End: 2020-07-13 | Stop reason: SURG

## 2020-07-13 RX ORDER — PROPOFOL 10 MG/ML
VIAL (ML) INTRAVENOUS AS NEEDED
Status: DISCONTINUED | OUTPATIENT
Start: 2020-07-13 | End: 2020-07-13 | Stop reason: SURG

## 2020-07-13 RX ORDER — SODIUM CHLORIDE, SODIUM LACTATE, POTASSIUM CHLORIDE, CALCIUM CHLORIDE 600; 310; 30; 20 MG/100ML; MG/100ML; MG/100ML; MG/100ML
30 INJECTION, SOLUTION INTRAVENOUS CONTINUOUS PRN
Status: DISCONTINUED | OUTPATIENT
Start: 2020-07-13 | End: 2020-07-13 | Stop reason: HOSPADM

## 2020-07-13 RX ORDER — GLYCOPYRROLATE 0.2 MG/ML
INJECTION INTRAMUSCULAR; INTRAVENOUS AS NEEDED
Status: DISCONTINUED | OUTPATIENT
Start: 2020-07-13 | End: 2020-07-13 | Stop reason: SURG

## 2020-07-13 RX ORDER — PROPOFOL 10 MG/ML
VIAL (ML) INTRAVENOUS CONTINUOUS PRN
Status: DISCONTINUED | OUTPATIENT
Start: 2020-07-13 | End: 2020-07-13 | Stop reason: SURG

## 2020-07-13 RX ADMIN — GLYCOPYRROLATE 0.2 MG: 0.2 INJECTION INTRAMUSCULAR; INTRAVENOUS at 07:57

## 2020-07-13 RX ADMIN — LIDOCAINE HYDROCHLORIDE 100 MG: 20 INJECTION, SOLUTION INFILTRATION; PERINEURAL at 07:57

## 2020-07-13 RX ADMIN — PHENYLEPHRINE HYDROCHLORIDE 100 MCG: 10 INJECTION INTRAVENOUS at 08:25

## 2020-07-13 RX ADMIN — PHENYLEPHRINE HYDROCHLORIDE 100 MCG: 10 INJECTION INTRAVENOUS at 08:06

## 2020-07-13 RX ADMIN — PHENYLEPHRINE HYDROCHLORIDE 100 MCG: 10 INJECTION INTRAVENOUS at 08:10

## 2020-07-13 RX ADMIN — PHENYLEPHRINE HYDROCHLORIDE 100 MCG: 10 INJECTION INTRAVENOUS at 08:28

## 2020-07-13 RX ADMIN — PROPOFOL 250 MCG/KG/MIN: 10 INJECTION, EMULSION INTRAVENOUS at 07:57

## 2020-07-13 RX ADMIN — SODIUM CHLORIDE, POTASSIUM CHLORIDE, SODIUM LACTATE AND CALCIUM CHLORIDE 30 ML/HR: 600; 310; 30; 20 INJECTION, SOLUTION INTRAVENOUS at 07:31

## 2020-07-13 RX ADMIN — PHENYLEPHRINE HYDROCHLORIDE 100 MCG: 10 INJECTION INTRAVENOUS at 08:20

## 2020-07-13 RX ADMIN — PROPOFOL 100 MG: 10 INJECTION, EMULSION INTRAVENOUS at 07:57

## 2020-07-13 NOTE — ANESTHESIA POSTPROCEDURE EVALUATION
"Patient: Willy Saenz    Procedure Summary     Date:  07/13/20 Room / Location:   [facility]    Anesthesia Start:  0751 Anesthesia Stop:  0835    Procedures:       COLONOSCOPY with bx (N/A )      ESOPHAGOGASTRODUODENOSCOPY WITH BIOPSY (N/A Esophagus) Diagnosis:       Epigastric pain      Diarrhea      Colitis      Abnormal CT scan, sigmoid colon      (Epigastric pain [R10.13])      (Diarrhea [R19.7])      (Colitis [K52.9])      (Abnormal CT scan, sigmoid colon [R93.3])    Surgeon:  Dinesh Starr MD Provider:  Sd Barakat MD    Anesthesia Type:  MAC ASA Status:  3          Anesthesia Type: MAC    Vitals  Vitals Value Taken Time   /95 7/13/2020  8:53 AM   Temp 36.7 °C (98 °F) 7/13/2020  8:53 AM   Pulse 74 7/13/2020  8:53 AM   Resp 12 7/13/2020  8:53 AM   SpO2 96 % 7/13/2020  8:53 AM           Post Anesthesia Care and Evaluation    Patient location during evaluation: PACU  Patient participation: complete - patient participated  Level of consciousness: awake  Pain score: 0  Pain management: adequate  Airway patency: patent  Anesthetic complications: No anesthetic complications  PONV Status: none  Cardiovascular status: acceptable  Respiratory status: acceptable  Hydration status: acceptable    Comments: /95   Pulse 74   Temp 36.7 °C (98 °F) (Oral)   Resp 12   Ht 177.8 cm (70\")   Wt 76.2 kg (168 lb)   SpO2 96%   BMI 24.11 kg/m²       "

## 2020-07-13 NOTE — ANESTHESIA PREPROCEDURE EVALUATION
Anesthesia Evaluation     Patient summary reviewed and Nursing notes reviewed                Airway   Mallampati: I  TM distance: >3 FB  Neck ROM: full  No difficulty expected  Dental - normal exam     Pulmonary - normal exam   (+) a smoker Former,   Cardiovascular - normal exam    (+) hyperlipidemia,       Neuro/Psych  (+) psychiatric history Anxiety and Depression,     GI/Hepatic/Renal/Endo - negative ROS     Musculoskeletal     (+) back pain,   Abdominal  - normal exam    Bowel sounds: normal.   Substance History - negative use     OB/GYN negative ob/gyn ROS         Other   arthritis,    history of cancer                    Anesthesia Plan    ASA 3     MAC       Anesthetic plan, all risks, benefits, and alternatives have been provided, discussed and informed consent has been obtained with: patient.

## 2020-07-13 NOTE — H&P
Patient  Name VARGHESE VENCES (81yo, M) ID# [id number]    [address]  Provider DARINEL GOODWIN MD  Insurance   [place]  Insurance # : [id number]  [place]  Insurance # : [id number]  Policy/Group # : [id number]  Prescription: [facility] - Member is eligible. details    Chief Complaint  constipation/diarrhea/change in bowel habits    Patient's Care Team  Primary Care Provider: VARGHESE SAMUELS MD: [id number] [id number], Ph [phone], Fax [phone] NPI: [id number]    Vitals  AF/VSS  Ht: 5 ft 10 in   Wt: 170 lbs   BMI: 24.4    Allergies  Reviewed Allergies  NKDA    Medications  Reviewed Medications  Boostrix Tdap 2.5 Lf unit-8 mcg-5 Lf/0.5 mL intramuscular syringe  20   filled [employer]  carvediloL 12.5 mg tablet  20   filled [employer]  cephALEXin 500 mg capsule  20   filled [employer]  cholestyramine (with sugar) 4 gram powder for susp in a packet  20   filled [employer]  citalopram 20 mg tablet  20   filled [employer]  ezetimibe 10 mg tablet  20   filled [employer]  levocetirizine 5 mg tablet  20   filled [employer]  levothyroxine 50 mcg tablet  20   filled [employer]  meloxicam 15 mg tablet  10/10/19   filled [employer]  metroNIDAZOLE 500 mg tablet  20   filled [employer]  rosuvastatin 40 mg tablet  06/15/20   filled [employer]  Shingrix (PF) 50 mcg/0.5 mL intramuscular suspension, kit  20   filled [employer]  traMADoL 50 mg tablet  20   filled [employer]    Family History  Reviewed Family History    Social History  Reviewed Social History  General Surgery  Alcohol intake: Moderate (Notes: 2 drinks daily)  Occupation: [employer]  Marital status:   Tobacco Smoking Status: Former smoker    Surgical History  Reviewed Surgical History  Egd biopsy single/multiple - 2018 - Ref esophagitis, gastritis, HH  Colonoscopy - 2016 - tics    Past Medical  History  Reviewed Past Medical History  Anxiety Disorder: Y  Depression: Y  High Cholesterol: Y  Hypertension: Y  Hypothyroidism: Y  Prostate Disease/Cancer: Y - CANCER    HPI  This now 80-year-old gentleman comes in for the first time to see me in 2 years. 4 years ago he had a colonoscopy for screening/surveillance purposes and was found to have diverticular disease only. 2 years ago he came in with epigastric pain and nausea and had an upper endoscopy which confirmed gastritis esophagitis and a hiatal hernia. We put him on Carafate and twice daily proton pump inhibitor therapy and apparently his symptoms improved to the point he got off of those medications and was doing fine until a month ago. A month ago he started with unrelenting diarrhea including 5-10 very loose bowel movements daily and even black stool. He also had some epigastric pain and distress as well as nausea. He went to his primary and had cultures that were negative. He then had a CT scan which confirmed mild acute diverticulitis and/or colitis of the distal descending and sigmoid colon. He was put on Keflex and Flagyl and sent to me for a follow-up evaluation. Interestingly, he has been on the medicine 6 or 7 days and today was his first good day in quite some time. His weight is stable. He denies trouble swallowing. He has no family history of GI disease or cancer. His bowel movements today have been loose but not claudio diarrhea.    ROS  Patient reports lethargy but reports no fever, no significant weight loss, and no chills. He reports no chest pain and no palpitations; no syncope. He reports abdominal pain, nausea, black or tarry stools, frequent diarrhea, and dyspepsia but reports no vomiting, no constipation, normal appetite, and no GERD; no dysphagia, no rectal bleeding, +change in bowel movements. He reports increased urinary frequency but reports no incontinence, no difficulty urinating, and no hematuria. He reports back pain but reports  no arthralgias/joint pain and no neck pain. He reports no vision change. He reports no difficulty hearing. He reports no sore throat. He reports no wheezing and no shortness of breath. He reports no jaundice and no rashes. He reports no numbness, no dizziness, no headaches, and no tremor. He reports no fatigue. He reports no swollen glands, no bruising, and no excessive bleeding.    Physical Exam  Patient is a 80-year-old male.    Constitutional: General Appearance: healthy-appearing.    Eyes: Pupils: PERRLA.    Neck: Neck: supple.    Lungs: Auscultation: breath sounds normal.    Cardiovascular: Heart Auscultation: RRR.    Abdomen: Inspection and Palpation: no masses or tenderness (no guarding, no rebound) and soft and non-distended.    Musculoskeletal:: Joints, Bones, and Muscles: normal movement of all extremities.    Assessment / Plan  Patient presents with upper and lower GI complaints including abdominal pain, dyspepsia and frequent diarrhea associated with melena. It is been at least 2 years since we looked at his stomach and we did identify pathology last time so I believe an upper endoscopy is indicated to rule out recurrent or progressive disease. It is been 4 years or more since we looked at his colon with the melena I think we had a check that as well given the abnormal CT scan. It is interesting that he starting to feel better as of today so perhaps this was all just related to infection and I asked him to continue his antibiotics until the regimen is complete. Further recommendations will then follow depending on the ultimate results of the endoscopies. Plan was discussed and he is agreeable. Approximately 30 minutes spent in counseling and chart review alone.      1. Diarrhea      2. Melena  K92.1: Melena    3. Epigastric pain  R10.13: Epigastric pain      Return to Office  None recorded.  Encounter Sign-Off  Encounter signed-off by Dinesh Starr MD, 07/07/2020.

## 2020-07-14 LAB
CYTO UR: NORMAL
LAB AP CASE REPORT: NORMAL
LAB AP DIAGNOSIS COMMENT: NORMAL
PATH REPORT.FINAL DX SPEC: NORMAL
PATH REPORT.GROSS SPEC: NORMAL

## 2020-08-19 ENCOUNTER — TRANSCRIBE ORDERS (OUTPATIENT)
Dept: ADMINISTRATIVE | Facility: HOSPITAL | Age: 80
End: 2020-08-19

## 2020-08-19 DIAGNOSIS — R10.11 RUQ PAIN: ICD-10-CM

## 2020-08-19 DIAGNOSIS — R11.0 NAUSEA: Primary | ICD-10-CM

## 2020-08-19 DIAGNOSIS — R14.0 BLOATING: ICD-10-CM

## 2020-08-21 ENCOUNTER — HOSPITAL ENCOUNTER (OUTPATIENT)
Dept: NUCLEAR MEDICINE | Facility: HOSPITAL | Age: 80
Discharge: HOME | End: 2020-08-21

## 2020-08-21 DIAGNOSIS — R10.11 RUQ PAIN: ICD-10-CM

## 2020-08-21 DIAGNOSIS — R11.0 NAUSEA: ICD-10-CM

## 2020-08-21 DIAGNOSIS — R14.0 BLOATING: ICD-10-CM

## 2020-08-21 PROCEDURE — 78227 HEPATOBIL SYST IMAGE W/DRUG: CPT

## 2020-08-21 PROCEDURE — 25010000002 SINCALIDE PER 5 MCG: Performed by: SURGERY

## 2020-08-21 PROCEDURE — 0 TECHNETIUM TC 99M MEBROFENIN KIT: Performed by: SURGERY

## 2020-08-21 PROCEDURE — A9537 TC99M MEBROFENIN: HCPCS | Performed by: SURGERY

## 2020-08-21 RX ORDER — KIT FOR THE PREPARATION OF TECHNETIUM TC 99M MEBROFENIN 45 MG/10ML
1 INJECTION, POWDER, LYOPHILIZED, FOR SOLUTION INTRAVENOUS
Status: COMPLETED | OUTPATIENT
Start: 2020-08-21 | End: 2020-08-21

## 2020-08-21 RX ADMIN — SINCALIDE 1.5 MCG: 5 INJECTION, POWDER, LYOPHILIZED, FOR SOLUTION INTRAVENOUS at 10:04

## 2020-08-21 RX ADMIN — MEBROFENIN 1 DOSE: 45 INJECTION, POWDER, LYOPHILIZED, FOR SOLUTION INTRAVENOUS at 08:41

## 2020-12-08 ENCOUNTER — HOSPITAL ENCOUNTER (OUTPATIENT)
Dept: GENERAL RADIOLOGY | Facility: HOSPITAL | Age: 80
Discharge: HOME | End: 2020-12-08
Admitting: INTERNAL MEDICINE

## 2020-12-08 ENCOUNTER — TRANSCRIBE ORDERS (OUTPATIENT)
Dept: ADMINISTRATIVE | Facility: HOSPITAL | Age: 80
End: 2020-12-08

## 2020-12-08 DIAGNOSIS — R07.9 CHEST PAIN, UNSPECIFIED TYPE: ICD-10-CM

## 2020-12-08 DIAGNOSIS — R07.9 CHEST PAIN, UNSPECIFIED TYPE: Primary | ICD-10-CM

## 2020-12-08 PROCEDURE — 71046 X-RAY EXAM CHEST 2 VIEWS: CPT

## 2021-06-08 ENCOUNTER — TRANSCRIBE ORDERS (OUTPATIENT)
Dept: ADMINISTRATIVE | Facility: HOSPITAL | Age: 81
End: 2021-06-08

## 2021-06-08 ENCOUNTER — HOSPITAL ENCOUNTER (OUTPATIENT)
Dept: GENERAL RADIOLOGY | Facility: HOSPITAL | Age: 81
Discharge: HOME | End: 2021-06-08
Admitting: INTERNAL MEDICINE

## 2021-06-08 DIAGNOSIS — R07.81 RIB PAIN: Primary | ICD-10-CM

## 2021-06-08 DIAGNOSIS — R07.81 RIB PAIN: ICD-10-CM

## 2021-06-08 PROCEDURE — 71101 X-RAY EXAM UNILAT RIBS/CHEST: CPT

## 2021-09-09 ENCOUNTER — TELEPHONE (OUTPATIENT)
Dept: NEUROLOGY | Facility: OTHER | Age: 81
End: 2021-09-09

## 2021-09-09 NOTE — TELEPHONE ENCOUNTER
Pt WAS CALLING TO SEE IF THERE WAS A SOONER AVAILABLE.  COULD NOT LOCATE SOONER APPT.  CONFIRMED PT WAS ON WAIT LIST.

## 2021-10-22 ENCOUNTER — OFFICE VISIT (OUTPATIENT)
Dept: NEUROLOGY | Facility: CLINIC | Age: 81
End: 2021-10-22

## 2021-10-22 VITALS
DIASTOLIC BLOOD PRESSURE: 82 MMHG | BODY MASS INDEX: 27.2 KG/M2 | SYSTOLIC BLOOD PRESSURE: 138 MMHG | HEART RATE: 47 BPM | OXYGEN SATURATION: 98 % | HEIGHT: 70 IN | WEIGHT: 190 LBS

## 2021-10-22 DIAGNOSIS — G20 PARKINSON'S DISEASE (HCC): Primary | ICD-10-CM

## 2021-10-22 PROBLEM — M47.819 SPONDYLOSIS WITHOUT MYELOPATHY: Status: ACTIVE | Noted: 2018-06-06

## 2021-10-22 PROBLEM — K20.90 CHRONIC ESOPHAGITIS: Status: ACTIVE | Noted: 2019-05-17

## 2021-10-22 PROBLEM — S20.219A CONTUSION OF CHEST: Status: ACTIVE | Noted: 2021-06-08

## 2021-10-22 PROBLEM — R79.89 ELEVATED LFTS: Status: ACTIVE | Noted: 2018-10-29

## 2021-10-22 PROBLEM — K57.30 DIVERTICULAR DISEASE OF COLON: Status: ACTIVE | Noted: 2017-10-09

## 2021-10-22 PROBLEM — T14.8XXA WOUND OF SKIN: Status: ACTIVE | Noted: 2017-04-24

## 2021-10-22 PROBLEM — I95.9 LOW BLOOD PRESSURE: Status: ACTIVE | Noted: 2018-08-13

## 2021-10-22 PROBLEM — R79.89 LOW TESTOSTERONE IN MALE: Status: ACTIVE | Noted: 2018-10-29

## 2021-10-22 PROBLEM — I25.10 CORONARY ARTERY DISEASE INVOLVING NATIVE CORONARY ARTERY OF NATIVE HEART WITHOUT ANGINA PECTORIS: Status: ACTIVE | Noted: 2018-10-29

## 2021-10-22 PROBLEM — G25.0 HEREDITARY ESSENTIAL TREMOR: Status: ACTIVE | Noted: 2021-06-03

## 2021-10-22 PROBLEM — IMO0002 HYPOGONADISM: Status: ACTIVE | Noted: 2018-08-14

## 2021-10-22 PROBLEM — K59.09 CHRONIC CONSTIPATION: Status: ACTIVE | Noted: 2017-10-26

## 2021-10-22 PROBLEM — J30.2 SEASONAL ALLERGIES: Status: ACTIVE | Noted: 2021-03-19

## 2021-10-22 PROBLEM — M79.609 EXTREMITY PAIN: Status: ACTIVE | Noted: 2021-10-22

## 2021-10-22 PROBLEM — E78.5 DYSLIPIDEMIA: Status: ACTIVE | Noted: 2021-07-20

## 2021-10-22 PROBLEM — E88.09 PROTEINS SERUM PLASMA LOW: Status: ACTIVE | Noted: 2017-10-09

## 2021-10-22 PROBLEM — J30.89 NON-SEASONAL ALLERGIC RHINITIS: Status: ACTIVE | Noted: 2017-10-09

## 2021-10-22 PROBLEM — R53.83 FATIGUE: Status: ACTIVE | Noted: 2017-10-09

## 2021-10-22 PROBLEM — R31.9 BLOOD IN URINE: Status: ACTIVE | Noted: 2021-04-29

## 2021-10-22 PROBLEM — H61.20 IMPACTED CERUMEN: Status: ACTIVE | Noted: 2017-10-09

## 2021-10-22 PROBLEM — S99.929A INJURY OF FOOT: Status: ACTIVE | Noted: 2021-05-05

## 2021-10-22 PROBLEM — N19 RENAL FAILURE SYNDROME: Status: ACTIVE | Noted: 2021-03-25

## 2021-10-22 PROBLEM — C44.90 MALIGNANT NEOPLASM OF SKIN: Status: ACTIVE | Noted: 2017-10-09

## 2021-10-22 PROBLEM — IMO0002 LACERATION: Status: ACTIVE | Noted: 2017-08-21

## 2021-10-22 PROBLEM — Z85.828 HISTORY OF SKIN CANCER: Status: ACTIVE | Noted: 2018-10-29

## 2021-10-22 PROBLEM — G25.0 HEREDITARY ESSENTIAL TREMOR: Status: RESOLVED | Noted: 2021-06-03 | Resolved: 2021-10-22

## 2021-10-22 PROBLEM — M79.645 PAIN OF LEFT THUMB: Status: ACTIVE | Noted: 2020-02-04

## 2021-10-22 PROBLEM — R73.03 PREDIABETES: Status: ACTIVE | Noted: 2018-10-29

## 2021-10-22 PROBLEM — G47.09 SECONDARY INSOMNIA: Status: ACTIVE | Noted: 2018-10-29

## 2021-10-22 PROBLEM — F32.A DEPRESSIVE DISORDER: Status: ACTIVE | Noted: 2021-03-19

## 2021-10-22 PROBLEM — J18.9 LEFT LOWER LOBE PNEUMONIA: Status: ACTIVE | Noted: 2018-08-14

## 2021-10-22 PROBLEM — N39.0 URINARY TRACT INFECTIOUS DISEASE: Status: ACTIVE | Noted: 2021-04-29

## 2021-10-22 PROBLEM — D84.9 IMMUNODEFICIENCY DISORDER: Status: ACTIVE | Noted: 2017-10-16

## 2021-10-22 PROBLEM — K21.9 GASTROESOPHAGEAL REFLUX DISEASE: Status: ACTIVE | Noted: 2021-03-19

## 2021-10-22 PROBLEM — I27.20 PULMONARY HYPERTENSION: Status: ACTIVE | Noted: 2017-10-09

## 2021-10-22 PROBLEM — R93.0 ABNORMAL MAGNETIC RESONANCE IMAGING OF HEAD: Status: ACTIVE | Noted: 2019-05-17

## 2021-10-22 PROBLEM — N28.1 KIDNEY CYSTS: Status: ACTIVE | Noted: 2021-10-22

## 2021-10-22 PROBLEM — R94.4 RENAL FUNCTION TEST ABNORMAL: Status: ACTIVE | Noted: 2019-05-17

## 2021-10-22 PROBLEM — R68.81 EARLY SATIETY: Status: ACTIVE | Noted: 2017-10-09

## 2021-10-22 PROBLEM — U07.1 COVID-19: Status: ACTIVE | Noted: 2020-12-22

## 2021-10-22 PROBLEM — G20.A1 PARKINSON'S DISEASE: Status: ACTIVE | Noted: 2021-10-22

## 2021-10-22 PROBLEM — M70.22 OLECRANON BURSITIS OF LEFT ELBOW: Status: ACTIVE | Noted: 2018-04-17

## 2021-10-22 PROBLEM — H90.3 BILATERAL HIGH FREQUENCY SENSORINEURAL HEARING LOSS: Status: ACTIVE | Noted: 2021-06-03

## 2021-10-22 PROBLEM — H93.13 TINNITUS OF BOTH EARS: Status: ACTIVE | Noted: 2017-10-09

## 2021-10-22 PROBLEM — M20.40 HAMMER TOE: Status: ACTIVE | Noted: 2017-10-09

## 2021-10-22 PROBLEM — R73.09 BLOOD GLUCOSE ABNORMAL: Status: ACTIVE | Noted: 2019-05-17

## 2021-10-22 PROCEDURE — 99215 OFFICE O/P EST HI 40 MIN: CPT | Performed by: PSYCHIATRY & NEUROLOGY

## 2021-10-22 NOTE — PATIENT INSTRUCTIONS
Parkinson disease treatment options -- education, support, and therapy    PARKINSON DISEASE OVERVIEW  Parkinson disease is a chronic movement disorder that is best managed with a combination of close monitoring, medication, education, support and therapy, exercise, and nutrition. Surgery can be beneficial for some people with Parkinson disease.    PARKINSON DISEASE EDUCATION  Being diagnosed with a chronic disease can be a frightening experience, filled with uncertainty about what the future holds. This is especially true if the person knows a friend or family member who has experienced the disabling effects of Parkinson disease.    In the first months and years after being diagnosed with Parkinson disease, it is important to gather information about initial symptoms and the treatment options that are available.    PARKINSON DISEASE SUPPORT  A person may respond to the diagnosis of Parkinson disease with anger, fear, depression, anxiety, resentment, or a combination of these emotions. Concerns about social and financial well-being are common. Support groups can help the patient and family to interact with other individuals who have the same diagnosis to allow these people to share experiences and information. Support groups also provide opportunities to hear speakers talk about various aspects of Parkinson disease. Several national groups are listed below. (See 'Where to get more information' below.)    People with young-onset Parkinson disease may benefit from a group composed of similar-aged patients. A handbook (Parkinson's Disease Handbook) and other informative publications geared for patients and families are available through the American Parkinson Disease Association.    Other types of support are available for people with Parkinson disease and their families, including psychologic, financial, legal, or occupational counseling. A physician, nurse, or  can usually provide contact information for  these services in the local area.    EXERCISE AND PHYSICAL THERAPY  Many studies suggest that exercise may slow the progression of Parkinson disease. However, this will have to be confirmed by prospective clinical trials to document this effect on disease progression.    Exercise can also help patients feel better, both physically and mentally. Aerobic exercise may have a positive effect on disease status while improving quality of life and socialization. Favorable studies have appeared in the medical literature on exercises to improve balance, flexibility, and strength (including dance and aziza chi). However, these reports will need to be confirmed in larger groups of people followed for longer periods of time.    Exercise can help to prevent some of the complications of Parkinson disease caused by rigidity and flexed (or bent) posture, such as shoulder, hip, and back pain. The benefits of exercise will persist as long as exercise continues.    Many patients who participate in an exercise program feel more confident and gain a sense of control over their disease. Parkinson-specific exercise programs also provide a source of social support and camaraderie, separate from and complementary to the support options above.    Simple strengthening and stretching exercises are important for everyone with Parkinson disease. Aerobic exercises, such as walking (outdoors or on a treadmill, with support), riding a stationary bicycle, swimming, or water aerobics, are easy to perform and usually energizing. A physical therapist can help you develop an exercise program that suits your needs.    PARKINSON DISEASE SAFETY ISSUES    Falls prevention -- As Parkinson disease gets worse, the risk of falling increases. To reduce this risk, patients and caretakers are encouraged to make the home as safe as possible by:    ?Installing shower or tub grab-bars, nonslip tape on floors, and elevated toilet seats with handles.    ?Having adequate  lighting in the house, especially at night. Use of light-sensitive night lights or lamps on a timer may be helpful.    ?Securing loose rugs, which can increase the risk of tripping.    The Centers for Disease Control and Prevention (CDC) has fall prevention materials available online (www.cdc.gov/homeandrecreationalsafety/falls/index.html).    Driving safety -- Most people with Parkinson disease can continue to drive as long as their motor symptoms remain mild. Driving ability must be monitored and formally reevaluated if and when motor and cognitive symptoms worsen. The Association for  [employer] can provide names of local occupational therapists or driving specialists who can perform driving tests to see if Parkinson disease is affecting driving, and neurologists can provide referrals for these evaluations.    If it is necessary to stop or cut back on driving, other forms of transportation are available, such as taxi cabs, shuttle buses, public buses, or trains. Walking, if practical, is always a healthy way to get around. The [employer]  is a resource that can provide assistance in locating help with transportation as well as housing, financial or legal services, health insurance, and long-term care.    PARKINSON DISEASE SPEECH THERAPY  Problems with speech, including slurred speech and speaking too quietly, are common in people with Parkinson disease. These problems develop as muscles weaken in the voice box, throat, mouth, tongue, and lips.    A voice or speech therapist can help overcome speech problems. This may involve training to speak more loudly and clearly, conserving energy when speaking (speaking only important words and phrases), and using nonverbal methods such as a letter or word board, or hand signals. Patients and families can locate Parkinson-trained speech therapists in their area through the [facility] website ([email or web address]/).    A speech  "therapist can also evaluate and treat problems with swallowing. The medical term for difficulty with swallowing is \"dysphagia.\" Dysphagia can increase the risk of coughing, choking, or inhaling food (aspiration), which can lead to pneumonia. Treatments for dysphagia may include sitting up straight while eating, tilting the head slightly forward, eating small bites and chewing completely, and not speaking while eating. Another treatment is the use of a powder to thicken liquids or thin food, making them easier to swallow.    PARKINSON DISEASE NUTRITION  There is no specific diet recommended for people with Parkinson disease. Several studies suggest that the MIND diet (Mediterranean-DASH Intervention for Neurodegenerative Delay) reduces the risk of dementia in people at risk for or with early signs of Parkinson disease, and reduces the risk of Alzheimer disease.    Some patients notice that protein in a meal can block the effect of a dose of levodopa taken around meal time. People who notice this effect should speak to their healthcare provider about adjusting the timing of their medications, rather than simply avoiding protein, which can lead to loss of muscle mass.    It is important to be sure that the person is getting an adequate number of calories and nutrients to maintain strength, bone structure, and muscle mass. Problems with unintended weight loss, poor appetite, eating and swallowing, or preparing food should be discussed with a healthcare provider. The provider may recommend meeting with a registered dietitian.    Constipation is a frequent problem for people with Parkinson disease because of disease-related changes in the bowels or as a side effect of Parkinson disease medications. Constipation can usually be managed with changes in diet, increased hydration, increased exercise, and occasionally the use of a laxative or stool softener. Some people with Parkinson disease experience straining with bowel " movements because of incomplete relaxation of muscles in the pelvis. Using step stools to elevate the knees while seated on the toilet can help relax the pelvic muscles.     WHERE TO GET MORE INFORMATION  Your healthcare provider is the best source of information for questions and concerns related to your medical problem.    The following organizations also provide reliable health information:    ?Parkinson's Foundation    ?American Parkinson Disease Association    ?[employer] [place] of Neurological [employer] and Stroke

## 2021-10-22 NOTE — PROGRESS NOTES
CC: Resting tremor     HPI:  Willy Saenz is a  81 y.o.  right-handed male presents today for neurological consult regarding a tremor, he was referred by his PCP Dr. Almeida.  Patient's other past medical history is notable for hypertension, hyperlipidemia, hypothyroidism, prostate cancer (2010) status post surgical excision, osteoarthritis, degenerative disc disease with some chronic back pain, GERD, allergies anxiety and depression.  Of note patient was seen in our office previously back in 2019, apparently he was experiencing some dizziness/vertigo, which did spontaneously resolved.  He had an MRI of his brain at this time (9/17/2018) which showed some diffuse atrophy and mild small vessel ischemic disease.  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    Patient reports he began noticing his tremor about 5 months ago.  He states it only involves his right hand and particularly just his thumb.  He states he notices it mostly at rest he notes that it does seem to suppress with postural movements or intention.  He has not noticed any significant weakness in his hands or incoordination in terms of fine motor movements.  He states it does not seem to be interfering whatsoever with ADLs.  Additionally he denies any gait changes, he ambulates independently without any assistive device, reports no problems with balance or stability, no recent falls.  Does not endorse any bradykinesia or rigidity.  He states he does have some urinary incontinence but reports this is longstanding since his prostatectomy.  He also admits to irregular bowel movements but specifically denies constipation.  There has been no change in his sense of taste or smell.  No symptoms of dizziness or lightheadedness upon standing.  No numbness and tingling in hands or feet.  Patient reports cognition remained stable, he denies trouble with  memory.     He does have some chronic back pain with radiation down his left leg into his lateral ankle.  This has been present for several years, patient is followed by pain management and has had multiple interventions including epidurals and also nerve ablations (most recently about 3 weeks ago).  He reports no history of significant head trauma or spinal trauma.  Additionally denies any previous seizure, stroke or syncope.  His family history is remarkable for Parkinson's disease in his sister, he suspects she was diagnosed in her late 60s, he remembers she did have some  shuffling gait, postural changes and tremor but states her primary symptom seem to be dementia with hallucinations which started perhaps 5 years after onset of motor symptoms.  Otherwise patient reports no significant family history of any neurological condition.  Noted he does have an identical twin brother who does not have tremor or any other Parkinson-like symptoms. Patient is a former smoker.    I have reviewed and agree with HPI documented by ANISHA Michelle. gns    Past Medical History:   Diagnosis Date   • Anxiety    • Arthritis    • Back pain    • Depression    • Disease of thyroid gland    • Environmental allergies    • Hyperlipidemia    • Prostate cancer (HCC)          Past Surgical History:   Procedure Laterality Date   • COLONOSCOPY N/A 7/13/2020    Procedure: COLONOSCOPY TO CECUM with bx;  Surgeon: Dinesh Starr MD;  Location: [facility] ENDOSCOPY;  Service: General;  Laterality: N/A;  PREOP/ melena, diarrhea  POSTOP/ diverticulosis, non specific colitis, proctocolitis   • ENDOSCOPY N/A 7/13/2020    Procedure: ESOPHAGOGASTRODUODENOSCOPY WITH BIOPSY;  Surgeon: Dinesh Starr MD;  Location: [facility] ENDOSCOPY;  Service: General;  Laterality: N/A;  PREOP/ abdominal pain  POSTOP/ gastritis, duodenitis, esophagitis   • PROSTATECTOMY     • TONSILLECTOMY             Current Outpatient Medications:   •  ALPRAZolam (XANAX) 0.5 MG tablet,  alprazolam 0.5 mg tablet  TAKE 1 TABLET EVERY DAY FOR ANXIETY ATTACK, Disp: , Rfl:   •  aspirin 325 MG tablet, Take 325 mg by mouth daily., Disp: , Rfl:   •  carvedilol (COREG) 6.25 MG tablet, Take 6.25 mg by mouth 2 (Two) Times a Day., Disp: , Rfl: 2  •  citalopram (CELEXA) 20 MG tablet, Take  by mouth., Disp: , Rfl:   •  ezetimibe (ZETIA) 10 MG tablet, Take 10 mg by mouth daily., Disp: , Rfl:   •  levocetirizine (XYZAL) 5 MG tablet, Take 5 mg by mouth every evening., Disp: , Rfl:   •  levothyroxine (SYNTHROID, LEVOTHROID) 50 MCG tablet, Take  by mouth., Disp: , Rfl:   •  OMEPRAZOLE PO, omeprazole 20 mg-clarithromycin 500 mg-amoxicill 500 mg(40) combo pack  Take by oral route., Disp: , Rfl:   •  rosuvastatin (CRESTOR) 40 MG tablet, Take 40 mg by mouth Daily., Disp: , Rfl:   •  Testosterone Cypionate 200 MG/ML solution, testosterone cypionate 200 mg/mL intramuscular oil, Disp: , Rfl:   •  traMADol (ULTRAM) 50 MG tablet, tramadol 50 mg tablet  Take 2 tablets 3 times a day by oral route., Disp: , Rfl:   •  Beclomethasone Diprop, Nasal, (QNASL NA), into each nostril., Disp: , Rfl:   •  carbamide peroxide (DEBROX) 6.5 % otic solution, Debrox 6.5 % ear drops  INSTILL 5 DROPS INTO AFFECTED EAR(S) BY OTIC ROUTE 2 TIMES PER DAY, Disp: , Rfl:   •  diclofenac (VOLTAREN) 1 % gel gel, diclofenac 1 % topical gel, Disp: , Rfl:   •  levoFLOXacin (LEVAQUIN) 500 MG tablet, Take 1 tablet by mouth Daily., Disp: 6 tablet, Rfl: 0  •  triamcinolone (KENALOG) 0.025 % ointment, triamcinolone acetonide 0.025 % topical ointment, Disp: , Rfl:       Family History   Problem Relation Age of Onset   • No Known Problems Mother    • Arthritis Father    • Cancer Father    • Heart disease Father    • Parkinsonism Father    • No Known Problems Maternal Grandmother    • No Known Problems Maternal Grandfather    • No Known Problems Paternal Grandmother    • No Known Problems Paternal Grandfather          Social History     Socioeconomic History   •  "Marital status:    Tobacco Use   • Smoking status: Former Smoker     Start date:      Quit date: 1970     Years since quittin.8   • Smokeless tobacco: Never Used   Substance and Sexual Activity   • Alcohol use: Yes     Comment: 2 drinks daily   • Drug use: No   • Sexual activity: Defer         Allergies   Allergen Reactions   • Dust Mite Extract    • Grass    • Molds & Smuts Other (See Comments)           ROS:  Review of Systems   Constitutional: Negative for activity change, appetite change and fatigue.   Eyes: Negative for pain, redness and itching.   Respiratory: Negative for cough, choking and shortness of breath.    Allergic/Immunologic: Negative for environmental allergies and food allergies.   Neurological: Negative for dizziness, tremors, seizures, syncope, facial asymmetry, speech difficulty, weakness, light-headedness, numbness and headaches.   Psychiatric/Behavioral: Negative for agitation, behavioral problems, confusion, decreased concentration, dysphoric mood, hallucinations, self-injury, sleep disturbance and suicidal ideas. The patient is not nervous/anxious and is not hyperactive.      I have reviewed the above ROS put in by the medical assistant and am in agreement.     Physical Exam:  Vitals:    10/22/21 1039   BP: 138/82   Pulse: (!) 47   SpO2: 98%   Weight: 86.2 kg (190 lb)   Height: 177.8 cm (70\")       Body mass index is 27.26 kg/m².    Physical Exam  General: Well-nourished elderly white male, no acute distress  HEENT: Normocephalic, atraumatic.  Oropharynx is clear  Neck: Supple, no masses. No cervical bruits  Heart: Regular rate and rhythm  Extremities: Radial pulses are equal and palpable.  No pedal edema      Neurological Exam:   Mental Status: Awake, alert, oriented to person, place and time.  Conversant without evidence of an affective disorder, thought disorder, delusions or hallucinations.  Attention span and concentration are normal.  Patient slightly anxious.  HCF: No " aphasia, apraxia or dysarthria.  Recent and remote memory intact.  Knowledge of recent events intact.  CN: I:   II: Visual fields full without left inattention   III, IV, VI: Eye movements intact without nystagmus or ptosis.  Pupils equal round and reactive to light.   V,VII: Light touch and pinprick intact all 3 divisions of V.  Facial muscles symmetrical.   VIII: Hearing intact to finger rub   IX,X: Soft palate elevates symmetrically   XI: Sternomastoid and trapezius are strong.   XII: Tongue midline without atrophy or fasciculations  Motor: Normal tone in upper and lower extremities, no cogwheeling.  There is marked atrophy present in bilateral APBs but otherwise bulk in the upper and lower extremities is normal   Power testing: Moderate weakness in bilateral APBs, but strength intact in flexor pollicis brevis and interosseous muscles, proximal muscles of upper extremity also remain intact.  Strength is  intact in all muscles tested in the lower extremities.  Reflexes: Upper extremities: +1 and equal        Lower extremities: +1 and equal        Toe signs: Relatively mute  Sensory: Light touch: Maybe mildly reduced in fingers.  Diffusely intact in lower extremities        Pinprick: Diffusely intact in upper and lower extremities                   Vibration: Intact at ankles        Position: Intact great toes    Cerebellar: Finger-to-nose: Intact           Rapid movement: Intact           Heel-to-shin: Intact    Gait and Station: Comes to stand without difficulty.  Step length looks normal.  There is noticeable decreased arm swing and activation of tremor in his right hand with ambulation.  Tandem walk is minimally impaired.  Negative Romberg, negative drift    Results:      Lab Results   Component Value Date    GLUCOSE 102 (H) 08/13/2018    BUN 15 08/13/2018    CREATININE 1.48 (H) 08/13/2018    EGFRIFNONA 46 (L) 08/13/2018    BCR 10.1 08/13/2018    CO2 28.7 08/13/2018    CALCIUM 8.6 08/13/2018    ALBUMIN 3.80  08/13/2018    AST 23 08/13/2018    ALT 27 08/13/2018       Lab Results   Component Value Date    WBC 11.27 (H) 08/13/2018    HGB 13.1 (L) 08/13/2018    HCT 41.2 08/13/2018    MCV 95.6 08/13/2018     08/13/2018         .No results found for: RPR      No results found for: TSH, D9HCAJJ, S2RMQPB, THYROIDAB      No results found for: [id number]      No results found for: FOLATE      No results found for: HGBA1C      Lab Results   Component Value Date    GLUCOSE 102 (H) 08/13/2018    BUN 15 08/13/2018    CREATININE 1.48 (H) 08/13/2018    EGFRIFNONA 46 (L) 08/13/2018    BCR 10.1 08/13/2018    K 4.0 08/13/2018    CO2 28.7 08/13/2018    CALCIUM 8.6 08/13/2018    ALBUMIN 3.80 08/13/2018    AST 23 08/13/2018    ALT 27 08/13/2018         Lab Results   Component Value Date    WBC 11.27 (H) 08/13/2018    HGB 13.1 (L) 08/13/2018    HCT 41.2 08/13/2018    MCV 95.6 08/13/2018     08/13/2018         Assessment:   1.  Parkinson's disease, with resting pill-rolling tremor and minimal gait changes  2.  Question of mild cognitive dysfunction  3.  Chronic low back pain, with history of what sounds like a left L5 radiculopathy without objective findings.    Plan:  1.  Lengthy discussion with patient about cardinal features of PD, disease progression and prognosis as well as epidemiology and genetic factors.  Certainly his presentation of a resting pill-rolling tremor is most consistent with a Parkinson's disease picture but as his tremor does not seem to be intrusive/interferes with ADLs would not recommend any treatment at this point.   - Additionally, did not think that any additional work-up is warranted at this point as it would not change our course of therapy  - Patient advised it is likely that he will develop other symptoms: Gait troubles, bradykinesia, rigidity and postural instability.  Also tremor could become worse.  Advised that at this point we would like to see him again to discuss treatment options.    2.   Patient does not endorse any cognitive problems or memory difficulties today.  Though during his exam some mild impairment is perceived.  We will continue to monitor, MMSE at follow-up.     3.  And finally in terms of his back pain, physical exam shows no new significant motor or sensory deficit.  No further evaluation needed at this time.  Patient should continue to follow with pain management.     We will plan for follow-up with us in about a year or sooner should need arise        Time 40 minutes          Dictated utilizing Dragon dictation.

## 2021-10-25 ENCOUNTER — TELEPHONE (OUTPATIENT)
Dept: NEUROLOGY | Facility: CLINIC | Age: 81
End: 2021-10-25

## 2021-10-25 NOTE — TELEPHONE ENCOUNTER
Caller: Willy Saenz    Relationship: Self    Best call back number: [phone]    Who are you requesting to speak with (clinical staff, provider,  specific staff member):   DR REYES OR DR REYES'S MA     What was the call regarding:   PT WAS SEEN ON 10-22-21 AND HAS QX REGARDING THE VISIT.    Do you require a callback: YES, PLEASE. PT IS REQUESTING CALL BACK TODAY.

## 2022-05-16 ENCOUNTER — OFFICE VISIT (OUTPATIENT)
Dept: NEUROLOGY | Facility: CLINIC | Age: 82
End: 2022-05-16

## 2022-05-16 VITALS
SYSTOLIC BLOOD PRESSURE: 118 MMHG | WEIGHT: 186 LBS | HEIGHT: 70 IN | BODY MASS INDEX: 26.63 KG/M2 | DIASTOLIC BLOOD PRESSURE: 70 MMHG | OXYGEN SATURATION: 99 % | HEART RATE: 50 BPM

## 2022-05-16 DIAGNOSIS — G25.2 RESTING TREMOR: Primary | ICD-10-CM

## 2022-05-16 PROCEDURE — 99214 OFFICE O/P EST MOD 30 MIN: CPT | Performed by: PHYSICIAN ASSISTANT

## 2022-05-16 RX ORDER — LISINOPRIL 20 MG/1
20 TABLET ORAL 2 TIMES DAILY
COMMUNITY

## 2022-05-18 NOTE — PROGRESS NOTES
LM WITH DR FRANKLIN'S OFFICE IN [facility]  ([phone]) MEDICAL RECORDS.  LM WITH OUR PHONE NUMBER AND FAX NUMBER.  5/17/22

## 2022-06-27 ENCOUNTER — TRANSCRIBE ORDERS (OUTPATIENT)
Dept: ADMINISTRATIVE | Facility: HOSPITAL | Age: 82
End: 2022-06-27

## 2022-06-27 DIAGNOSIS — M54.16 LUMBAR RADICULOPATHY: Primary | ICD-10-CM

## 2022-06-30 ENCOUNTER — APPOINTMENT (OUTPATIENT)
Dept: MRI IMAGING | Facility: HOSPITAL | Age: 82
End: 2022-06-30

## 2022-07-06 ENCOUNTER — HOSPITAL ENCOUNTER (OUTPATIENT)
Dept: MRI IMAGING | Facility: HOSPITAL | Age: 82
Discharge: HOME | End: 2022-07-06
Admitting: NURSE PRACTITIONER

## 2022-07-06 DIAGNOSIS — M54.16 LUMBAR RADICULOPATHY: ICD-10-CM

## 2022-07-06 PROCEDURE — 72158 MRI LUMBAR SPINE W/O & W/DYE: CPT

## 2022-07-06 PROCEDURE — 82565 ASSAY OF CREATININE: CPT

## 2022-07-06 PROCEDURE — 0 GADOBENATE DIMEGLUMINE 529 MG/ML SOLUTION: Performed by: NURSE PRACTITIONER

## 2022-07-06 PROCEDURE — A9577 INJ MULTIHANCE: HCPCS | Performed by: NURSE PRACTITIONER

## 2022-07-06 RX ADMIN — GADOBENATE DIMEGLUMINE 20 ML: 529 INJECTION, SOLUTION INTRAVENOUS at 14:55

## 2022-07-07 LAB — CREAT BLDA-MCNC: 1.5 MG/DL (ref 0.6–1.3)

## 2022-07-09 ENCOUNTER — APPOINTMENT (OUTPATIENT)
Dept: MRI IMAGING | Facility: HOSPITAL | Age: 82
End: 2022-07-09

## 2022-07-24 ENCOUNTER — APPOINTMENT (OUTPATIENT)
Dept: CARDIOLOGY | Facility: HOSPITAL | Age: 82
End: 2022-07-24

## 2022-07-24 ENCOUNTER — HOSPITAL ENCOUNTER (OUTPATIENT)
Facility: HOSPITAL | Age: 82
Setting detail: OBSERVATION
Discharge: HOME | End: 2022-07-24
Attending: EMERGENCY MEDICINE | Admitting: INTERNAL MEDICINE

## 2022-07-24 ENCOUNTER — APPOINTMENT (OUTPATIENT)
Dept: GENERAL RADIOLOGY | Facility: HOSPITAL | Age: 82
End: 2022-07-24

## 2022-07-24 VITALS
DIASTOLIC BLOOD PRESSURE: 87 MMHG | BODY MASS INDEX: 26.39 KG/M2 | OXYGEN SATURATION: 92 % | WEIGHT: 184.3 LBS | SYSTOLIC BLOOD PRESSURE: 157 MMHG | TEMPERATURE: 98.2 F | HEIGHT: 70 IN | HEART RATE: 55 BPM | RESPIRATION RATE: 16 BRPM

## 2022-07-24 DIAGNOSIS — R07.9 CHEST PAIN, UNSPECIFIED TYPE: Primary | ICD-10-CM

## 2022-07-24 LAB
ALBUMIN SERPL-MCNC: 3.6 G/DL (ref 3.5–5.2)
ALBUMIN/GLOB SERPL: 1.6 G/DL
ALP SERPL-CCNC: 45 U/L (ref 39–117)
ALT SERPL W P-5'-P-CCNC: 29 U/L (ref 1–41)
ANION GAP SERPL CALCULATED.3IONS-SCNC: 11.1 MMOL/L (ref 5–15)
ANION GAP SERPL CALCULATED.3IONS-SCNC: 11.7 MMOL/L (ref 5–15)
APTT PPP: 32.4 SECONDS (ref 22.7–35.4)
AST SERPL-CCNC: 26 U/L (ref 1–40)
BASOPHILS # BLD AUTO: 0.09 10*3/MM3 (ref 0–0.2)
BASOPHILS NFR BLD AUTO: 1.1 % (ref 0–1.5)
BH CV STRESS DURATION MIN STAGE 1: 3
BH CV STRESS DURATION SEC STAGE 1: 0
BH CV STRESS GRADE STAGE 1: 10
BH CV STRESS METS STAGE 1: 5
BH CV STRESS PROTOCOL 1: NORMAL
BH CV STRESS RECOVERY BP: NORMAL MMHG
BH CV STRESS RECOVERY HR: 130 BPM
BH CV STRESS SPEED STAGE 1: 1.7
BH CV STRESS STAGE 1: 1
BILIRUB SERPL-MCNC: 0.3 MG/DL (ref 0–1.2)
BUN SERPL-MCNC: 22 MG/DL (ref 8–23)
BUN SERPL-MCNC: 24 MG/DL (ref 8–23)
BUN/CREAT SERPL: 18.8 (ref 7–25)
BUN/CREAT SERPL: 19.5 (ref 7–25)
CALCIUM SPEC-SCNC: 8.5 MG/DL (ref 8.6–10.5)
CALCIUM SPEC-SCNC: 9.3 MG/DL (ref 8.6–10.5)
CHLORIDE SERPL-SCNC: 105 MMOL/L (ref 98–107)
CHLORIDE SERPL-SCNC: 109 MMOL/L (ref 98–107)
CO2 SERPL-SCNC: 22.9 MMOL/L (ref 22–29)
CO2 SERPL-SCNC: 23.3 MMOL/L (ref 22–29)
CREAT SERPL-MCNC: 1.13 MG/DL (ref 0.76–1.27)
CREAT SERPL-MCNC: 1.28 MG/DL (ref 0.76–1.27)
DEPRECATED RDW RBC AUTO: 47.4 FL (ref 37–54)
DEPRECATED RDW RBC AUTO: 47.8 FL (ref 37–54)
EGFRCR SERPLBLD CKD-EPI 2021: 56.6 ML/MIN/1.73
EGFRCR SERPLBLD CKD-EPI 2021: 65.7 ML/MIN/1.73
EOSINOPHIL # BLD AUTO: 0.56 10*3/MM3 (ref 0–0.4)
EOSINOPHIL NFR BLD AUTO: 7 % (ref 0.3–6.2)
ERYTHROCYTE [DISTWIDTH] IN BLOOD BY AUTOMATED COUNT: 13.9 % (ref 12.3–15.4)
ERYTHROCYTE [DISTWIDTH] IN BLOOD BY AUTOMATED COUNT: 14 % (ref 12.3–15.4)
GLOBULIN UR ELPH-MCNC: 2.2 GM/DL
GLUCOSE SERPL-MCNC: 107 MG/DL (ref 65–99)
GLUCOSE SERPL-MCNC: 98 MG/DL (ref 65–99)
HCT VFR BLD AUTO: 40.9 % (ref 37.5–51)
HCT VFR BLD AUTO: 42.4 % (ref 37.5–51)
HGB BLD-MCNC: 14.1 G/DL (ref 13–17.7)
HGB BLD-MCNC: 14.2 G/DL (ref 13–17.7)
IMM GRANULOCYTES # BLD AUTO: 0.03 10*3/MM3 (ref 0–0.05)
IMM GRANULOCYTES NFR BLD AUTO: 0.4 % (ref 0–0.5)
INR PPP: 1.11 (ref 0.9–1.1)
LYMPHOCYTES # BLD AUTO: 2.2 10*3/MM3 (ref 0.7–3.1)
LYMPHOCYTES NFR BLD AUTO: 27.4 % (ref 19.6–45.3)
MAXIMAL PREDICTED HEART RATE: 140 BPM
MCH RBC QN AUTO: 31.7 PG (ref 26.6–33)
MCH RBC QN AUTO: 32.1 PG (ref 26.6–33)
MCHC RBC AUTO-ENTMCNC: 33.5 G/DL (ref 31.5–35.7)
MCHC RBC AUTO-ENTMCNC: 34.5 G/DL (ref 31.5–35.7)
MCV RBC AUTO: 93.2 FL (ref 79–97)
MCV RBC AUTO: 94.6 FL (ref 79–97)
MONOCYTES # BLD AUTO: 0.99 10*3/MM3 (ref 0.1–0.9)
MONOCYTES NFR BLD AUTO: 12.3 % (ref 5–12)
NEUTROPHILS NFR BLD AUTO: 4.17 10*3/MM3 (ref 1.7–7)
NEUTROPHILS NFR BLD AUTO: 51.8 % (ref 42.7–76)
NRBC BLD AUTO-RTO: 0 /100 WBC (ref 0–0.2)
NT-PROBNP SERPL-MCNC: 284 PG/ML (ref 0–1800)
PERCENT MAX PREDICTED HR: 92.86 %
PLATELET # BLD AUTO: 212 10*3/MM3 (ref 140–450)
PLATELET # BLD AUTO: 220 10*3/MM3 (ref 140–450)
PMV BLD AUTO: 10.7 FL (ref 6–12)
PMV BLD AUTO: 10.9 FL (ref 6–12)
POTASSIUM SERPL-SCNC: 4.1 MMOL/L (ref 3.5–5.2)
POTASSIUM SERPL-SCNC: 4.6 MMOL/L (ref 3.5–5.2)
PROT SERPL-MCNC: 5.8 G/DL (ref 6–8.5)
PROTHROMBIN TIME: 14.2 SECONDS (ref 11.7–14.2)
QT INTERVAL: 439 MS
RBC # BLD AUTO: 4.39 10*6/MM3 (ref 4.14–5.8)
RBC # BLD AUTO: 4.48 10*6/MM3 (ref 4.14–5.8)
SARS-COV-2 RNA PNL SPEC NAA+PROBE: NOT DETECTED
SODIUM SERPL-SCNC: 140 MMOL/L (ref 136–145)
SODIUM SERPL-SCNC: 143 MMOL/L (ref 136–145)
STRESS BASELINE BP: NORMAL MMHG
STRESS BASELINE HR: 60 BPM
STRESS PERCENT HR: 109 %
STRESS POST PEAK BP: NORMAL MMHG
STRESS POST PEAK HR: 130 BPM
STRESS TARGET HR: 119 BPM
TROPONIN T SERPL-MCNC: <0.01 NG/ML (ref 0–0.03)
TROPONIN T SERPL-MCNC: <0.01 NG/ML (ref 0–0.03)
WBC NRBC COR # BLD: 8.04 10*3/MM3 (ref 3.4–10.8)
WBC NRBC COR # BLD: 8.84 10*3/MM3 (ref 3.4–10.8)

## 2022-07-24 PROCEDURE — 99284 EMERGENCY DEPT VISIT MOD MDM: CPT

## 2022-07-24 PROCEDURE — 85730 THROMBOPLASTIN TIME PARTIAL: CPT | Performed by: EMERGENCY MEDICINE

## 2022-07-24 PROCEDURE — 93017 CV STRESS TEST TRACING ONLY: CPT

## 2022-07-24 PROCEDURE — 36415 COLL VENOUS BLD VENIPUNCTURE: CPT

## 2022-07-24 PROCEDURE — 85610 PROTHROMBIN TIME: CPT | Performed by: EMERGENCY MEDICINE

## 2022-07-24 PROCEDURE — 71045 X-RAY EXAM CHEST 1 VIEW: CPT

## 2022-07-24 PROCEDURE — C9803 HOPD COVID-19 SPEC COLLECT: HCPCS

## 2022-07-24 PROCEDURE — 83880 ASSAY OF NATRIURETIC PEPTIDE: CPT | Performed by: EMERGENCY MEDICINE

## 2022-07-24 PROCEDURE — 99204 OFFICE O/P NEW MOD 45 MIN: CPT | Performed by: INTERNAL MEDICINE

## 2022-07-24 PROCEDURE — 93016 CV STRESS TEST SUPVJ ONLY: CPT | Performed by: INTERNAL MEDICINE

## 2022-07-24 PROCEDURE — 99217 PR OBSERVATION CARE DISCHARGE MANAGEMENT: CPT | Performed by: INTERNAL MEDICINE

## 2022-07-24 PROCEDURE — G0378 HOSPITAL OBSERVATION PER HR: HCPCS

## 2022-07-24 PROCEDURE — 84484 ASSAY OF TROPONIN QUANT: CPT | Performed by: EMERGENCY MEDICINE

## 2022-07-24 PROCEDURE — 87635 SARS-COV-2 COVID-19 AMP PRB: CPT | Performed by: EMERGENCY MEDICINE

## 2022-07-24 PROCEDURE — 93005 ELECTROCARDIOGRAM TRACING: CPT | Performed by: EMERGENCY MEDICINE

## 2022-07-24 PROCEDURE — 93018 CV STRESS TEST I&R ONLY: CPT | Performed by: INTERNAL MEDICINE

## 2022-07-24 PROCEDURE — 93010 ELECTROCARDIOGRAM REPORT: CPT | Performed by: INTERNAL MEDICINE

## 2022-07-24 PROCEDURE — 85027 COMPLETE CBC AUTOMATED: CPT

## 2022-07-24 PROCEDURE — 85025 COMPLETE CBC W/AUTO DIFF WBC: CPT | Performed by: EMERGENCY MEDICINE

## 2022-07-24 PROCEDURE — 80053 COMPREHEN METABOLIC PANEL: CPT | Performed by: EMERGENCY MEDICINE

## 2022-07-24 RX ORDER — SODIUM CHLORIDE 0.9 % (FLUSH) 0.9 %
10 SYRINGE (ML) INJECTION AS NEEDED
Status: DISCONTINUED | OUTPATIENT
Start: 2022-07-24 | End: 2022-07-24 | Stop reason: HOSPADM

## 2022-07-24 RX ORDER — CARVEDILOL 6.25 MG/1
6.25 TABLET ORAL 2 TIMES DAILY
Status: DISCONTINUED | OUTPATIENT
Start: 2022-07-24 | End: 2022-07-24 | Stop reason: HOSPADM

## 2022-07-24 RX ORDER — ASPIRIN 325 MG
325 TABLET ORAL DAILY
Status: DISCONTINUED | OUTPATIENT
Start: 2022-07-24 | End: 2022-07-24

## 2022-07-24 RX ORDER — ROSUVASTATIN CALCIUM 40 MG/1
40 TABLET, COATED ORAL DAILY
Status: DISCONTINUED | OUTPATIENT
Start: 2022-07-24 | End: 2022-07-24 | Stop reason: HOSPADM

## 2022-07-24 RX ORDER — ONDANSETRON 2 MG/ML
4 INJECTION INTRAMUSCULAR; INTRAVENOUS EVERY 6 HOURS PRN
Status: DISCONTINUED | OUTPATIENT
Start: 2022-07-24 | End: 2022-07-24 | Stop reason: HOSPADM

## 2022-07-24 RX ORDER — ACETAMINOPHEN 325 MG/1
650 TABLET ORAL EVERY 4 HOURS PRN
Status: DISCONTINUED | OUTPATIENT
Start: 2022-07-24 | End: 2022-07-24 | Stop reason: HOSPADM

## 2022-07-24 RX ORDER — ONDANSETRON 4 MG/1
4 TABLET, FILM COATED ORAL EVERY 6 HOURS PRN
Status: DISCONTINUED | OUTPATIENT
Start: 2022-07-24 | End: 2022-07-24 | Stop reason: HOSPADM

## 2022-07-24 RX ORDER — LEVOTHYROXINE SODIUM 0.05 MG/1
50 TABLET ORAL
Status: DISCONTINUED | OUTPATIENT
Start: 2022-07-24 | End: 2022-07-24 | Stop reason: HOSPADM

## 2022-07-24 RX ORDER — TRAMADOL HYDROCHLORIDE 50 MG/1
50 TABLET ORAL EVERY 8 HOURS PRN
Status: DISCONTINUED | OUTPATIENT
Start: 2022-07-24 | End: 2022-07-24 | Stop reason: HOSPADM

## 2022-07-24 RX ORDER — UREA 10 %
3 LOTION (ML) TOPICAL NIGHTLY PRN
Status: DISCONTINUED | OUTPATIENT
Start: 2022-07-24 | End: 2022-07-24 | Stop reason: HOSPADM

## 2022-07-24 RX ORDER — ALPRAZOLAM 0.5 MG/1
0.5 TABLET ORAL 2 TIMES DAILY PRN
Status: DISCONTINUED | OUTPATIENT
Start: 2022-07-24 | End: 2022-07-24 | Stop reason: HOSPADM

## 2022-07-24 RX ORDER — PANTOPRAZOLE SODIUM 40 MG/1
40 TABLET, DELAYED RELEASE ORAL NIGHTLY
Qty: 30 TABLET | Refills: 11 | Status: SHIPPED | OUTPATIENT
Start: 2022-07-24 | End: 2022-11-10 | Stop reason: SDUPTHER

## 2022-07-24 RX ORDER — CETIRIZINE HYDROCHLORIDE 10 MG/1
10 TABLET ORAL DAILY
Status: DISCONTINUED | OUTPATIENT
Start: 2022-07-24 | End: 2022-07-24 | Stop reason: HOSPADM

## 2022-07-24 RX ORDER — NITROGLYCERIN 0.4 MG/1
0.4 TABLET SUBLINGUAL
Status: DISCONTINUED | OUTPATIENT
Start: 2022-07-24 | End: 2022-07-24 | Stop reason: HOSPADM

## 2022-07-24 RX ORDER — CITALOPRAM 20 MG/1
20 TABLET ORAL DAILY
Status: DISCONTINUED | OUTPATIENT
Start: 2022-07-24 | End: 2022-07-24 | Stop reason: HOSPADM

## 2022-07-24 RX ADMIN — CETIRIZINE HYDROCHLORIDE 10 MG: 10 TABLET ORAL at 12:03

## 2022-07-24 RX ADMIN — NITROGLYCERIN 0.4 MG: 0.4 TABLET SUBLINGUAL at 04:19

## 2022-07-24 RX ADMIN — ROSUVASTATIN CALCIUM 40 MG: 40 TABLET, FILM COATED ORAL at 12:03

## 2022-07-24 RX ADMIN — CITALOPRAM 20 MG: 20 TABLET, FILM COATED ORAL at 12:03

## 2022-07-24 RX ADMIN — LEVOTHYROXINE SODIUM 50 MCG: 0.05 TABLET ORAL at 12:03

## 2022-07-24 NOTE — PLAN OF CARE
Patient admitted from ED for chest pain. Oriented to unit. No c/o chest pain since arrival to floor. Does have mild headache since receiving NG in ED. Meds reviewed and database completed. On room air. Up ad espinoza. Wife at bedside. Sinus ziggy on the monitor. Repeat labs drawn.   Plan: Patient to be NPO until seen by cardiology for possible stress test.   Will continue to monitor.       Problem: Adult Inpatient Plan of Care  Goal: Plan of Care Review  Outcome: Ongoing, Progressing  Goal: Patient-Specific Goal (Individualized)  Outcome: Ongoing, Progressing  Goal: Absence of Hospital-Acquired Illness or Injury  Outcome: Ongoing, Progressing  Intervention: Identify and Manage Fall Risk  Recent Flowsheet Documentation  Taken 7/24/2022 0541 by Armaan Olson RN  Safety Promotion/Fall Prevention: safety round/check completed  Intervention: Prevent Skin Injury  Recent Flowsheet Documentation  Taken 7/24/2022 0541 by Armaan Olson RN  Body Position: position changed independently  Intervention: Prevent and Manage VTE (Venous Thromboembolism) Risk  Recent Flowsheet Documentation  Taken 7/24/2022 0541 by Armaan Olson, RN  Activity Management: up ad espinoza  VTE Prevention/Management:   bilateral   dorsiflexion/plantar flexion performed  Goal: Optimal Comfort and Wellbeing  Outcome: Ongoing, Progressing  Intervention: Monitor Pain and Promote Comfort  Recent Flowsheet Documentation  Taken 7/24/2022 0541 by Armaan Olson, RN  Pain Management Interventions:   medication offered but refused   quiet environment facilitated  Intervention: Provide Person-Centered Care  Recent Flowsheet Documentation  Taken 7/24/2022 0541 by Armaan Olson, RN  Trust Relationship/Rapport:   care explained   questions answered   questions encouraged   thoughts/feelings acknowledged  Goal: Readiness for Transition of Care  Outcome: Ongoing, Progressing  Intervention: Mutually Develop Transition Plan  Recent Flowsheet  Documentation  Taken 7/24/2022 0605 by Armaan Olson, RN  Equipment Currently Used at Home: none  Transportation Anticipated:   car, drives self   family or friend will provide  Transportation Concerns: none  Patient/Family Anticipated Services at Transition: none  Patient/Family Anticipates Transition to:   home   home with family   Goal Outcome Evaluation:

## 2022-07-24 NOTE — ED NOTES
Nursing report ED to floor  Willy Saenz  82 y.o.  male    HPI :   Chief Complaint   Patient presents with   • Chest Pain       Admitting doctor:   Gianfranco Dawkins MD    Admitting diagnosis:   The encounter diagnosis was Chest pain, unspecified type.    Code status:   Current Code Status     Date Active Code Status Order ID Comments User Context       7/24/2022 0451 CPR (Attempt to Resuscitate) [id number]  Juventino Marks, [facility]     Advance Care Planning Activity      Questions for Current Code Status     Question Answer    Code Status (Patient has no pulse and is not breathing) CPR (Attempt to Resuscitate)    Medical Interventions (Patient has pulse or is breathing) Full    Level Of Support Discussed With Patient          Allergies:   Dust mite extract, Grass, and Molds & smuts    Intake and Output  No intake or output data in the 24 hours ending 07/24/22 0518    Weight:   There were no vitals filed for this visit.    Most recent vitals:   Vitals:    07/24/22 0331 07/24/22 0401 07/24/22 0424 07/24/22 0431   BP: 139/80 138/87 141/87 130/86   Pulse: 56 58 58 65   Resp:       Temp:       SpO2: 95% 95% 94% 92%       Active LDAs/IV Access:   Lines, Drains & Airways     Active LDAs     Name Placement date Placement time Site Days    Peripheral IV 07/24/22 0311 Left Antecubital 07/24/22 0311  Antecubital  less than 1                Labs (abnormal labs have a star):   Labs Reviewed   COMPREHENSIVE METABOLIC PANEL - Abnormal; Notable for the following components:       Result Value    Chloride 109 (*)     Calcium 8.5 (*)     Total Protein 5.8 (*)     All other components within normal limits    Narrative:     GFR Normal >60  Chronic Kidney Disease <60  Kidney Failure <15     PROTIME-INR - Abnormal; Notable for the following components:    INR 1.11 (*)     All other components within normal limits   CBC WITH AUTO DIFFERENTIAL - Abnormal; Notable for the following components:    Monocyte % 12.3 (*)     Eosinophil % 7.0  (*)     Monocytes, Absolute 0.99 (*)     Eosinophils, Absolute 0.56 (*)     All other components within normal limits   COVID-19,MAYKEL CHASE IN-HOUSE CEPHEID/ALONZO, NP SWAB IN TRANSPORT MEDIA 8-12 HR TAT - Normal    Narrative:     Fact sheet for providers: https://www.fda.gov/media/222388/download    Fact sheet for patients: https://www.fda.gov/media/173314/download    Test performed by PCR.   APTT - Normal   TROPONIN (IN-HOUSE) - Normal    Narrative:     Troponin T Reference Range:  <= 0.03 ng/mL-   Negative for AMI  >0.03 ng/mL-     Abnormal for myocardial necrosis.  Clinicians would have to utilize clinical acumen, EKG, Troponin and serial changes to determine if it is an Acute Myocardial Infarction or myocardial injury due to an underlying chronic condition.       Results may be falsely decreased if patient taking Biotin.     BNP (IN-HOUSE) - Normal    Narrative:     Among patients with dyspnea, NT-proBNP is highly sensitive for the detection of acute congestive heart failure. In addition NT-proBNP of <300 pg/ml effectively rules out acute congestive heart failure with 99% negative predictive value.    Results may be falsely decreased if patient taking Biotin.     COVID PRE-OP / PRE-PROCEDURE SCREENING ORDER (NO ISOLATION)    Narrative:     The following orders were created for panel order COVID PRE-OP / PRE-PROCEDURE SCREENING ORDER (NO ISOLATION) - Swab, Nasopharynx.  Procedure                               Abnormality         Status                     ---------                               -----------         ------                     COVID-19MAYKEL IN-HOUSE...[[id number]]  Normal              Final result                 Please view results for these tests on the individual orders.   TROPONIN (IN-HOUSE)   BASIC METABOLIC PANEL   CBC (NO DIFF)   CBC AND DIFFERENTIAL    Narrative:     The following orders were created for panel order CBC & Differential.  Procedure                               Abnormality          Status                     ---------                               -----------         ------                     CBC Auto Differential[[id number]]        Abnormal            Final result                 Please view results for these tests on the individual orders.       EKG:   ECG 12 Lead   Preliminary Result   HEART RATE= 57  bpm   RR Interval= 1053  ms   OR Interval= 156  ms   P Horizontal Axis= 9  deg   P Front Axis= 49  deg   QRSD Interval= 107  ms   QT Interval= 439  ms   QRS Axis= 42  deg   T Wave Axis= 61  deg   - OTHERWISE NORMAL ECG -   Sinus rhythm   RSR' in V1 or V2, right VCD or RVH   Electronically Signed By:    Date and Time of Study: 2022 03:25:19          Meds given in ED:   Medications   sodium chloride 0.9 % flush 10 mL (has no administration in time range)   nitroglycerin (NITROSTAT) SL tablet 0.4 mg (0.4 mg Sublingual Given 22 0419)   acetaminophen (TYLENOL) tablet 650 mg (has no administration in time range)   ondansetron (ZOFRAN) tablet 4 mg (has no administration in time range)     Or   ondansetron (ZOFRAN) injection 4 mg (has no administration in time range)   melatonin tablet 3 mg (has no administration in time range)       Imaging results:  XR Chest 1 View    Result Date: 2022  No acute findings.  This report was finalized on 2022 3:52 AM by Dr. Raissa Nolasco M.D.        Ambulatory status:   - as tolerated    Social issues:   Social History     Socioeconomic History   • Marital status:    Tobacco Use   • Smoking status: Former Smoker     Start date:      Quit date: 1970     Years since quittin.5   • Smokeless tobacco: Never Used   Substance and Sexual Activity   • Alcohol use: Yes     Comment: 2 drinks daily   • Drug use: No   • Sexual activity: Defer       NIH Stroke Scale:        Nursing report ED to floor:

## 2022-07-24 NOTE — ED NOTES
Patient arrived to ED via EMS. Pt called EMS after waking up to go to the bathroom and experiencing chest pain that radiated down left arm. Patient reported feeling sweaty. Upon EMS arrival patient was treated with 324mg of aspirin. After 5 mins patient reported that all symptoms resolved. Patient currently denies chest pain. Denies dizziness, N/V. No SOA.

## 2022-07-24 NOTE — CONSULTS
Date of Hospital Visit: 22  Encounter Provider: Braxton Medina MD  Place of Service: [facility] CARDIOLOGY  Patient Name: Willy Saenz  :1940  [id number]  Referral Provider: Gianfranco Dawkins MD    Chief complaint: chest pain    Reason for consult: chest pain    History of Present Illness:  Mr. Saenz is an 82 year old male with a past medical history of hyperlipidemia, prostate cancer, hypertension and hypothyroidism.     He has not been evaluated by cardiology in many years. He reported a negative stress test over fifteen years ago, and had an ECHO performed in 2016 to evaluate valvular function. This 2D echocardiogram showed normal LV systolic function (LVEF 64%), borderline left atrial enlargement and borderline pulmonary hypertension. This workup occurred at [facility].    He presented to the emergency department with complaints of chest pain with associated diaphoresis and nausea. The pain described the pain as squeezing, tight, and with radiation to his left arm. This episode occurred around 2am and lasted about 45 minutes. EMS was called and patient received aspirin in route.  Upon arrival to the ED, patient denied chest pain and vital signs were stable.  EKG on arrival showed SR without acute ST changes. Initial troponin was negative, .0. CXR showed no acute findings.    He received one sublingual nitroglycerin in the ER. He is admitted for chest pain.     I reviewed the above HPI and agree with it.  This is a previously pretty healthy 82-year-old gentleman he has had a history of hypertension.  He takes chronic aspirin at the direction of his PCP but has never had a vascular event.  He drinks 1 or 2 drinks every day.  He is physically active he was playing golf yesterday in the heat and did fine with that.  He had a fillet and baked potato last night nothing unusual about it did not eat late felt good went to bed woke up this morning from sleep with  tightness and dizziness did note that his blood pressure was a little bit low last night before he went to bed so he did not take his blood pressure medicine this lasted for about an hour he called 911 and they brought him to the emergency room he got admitted.  He does not have diabetes does not smoke does not have hyperlipidemia.  He recently is retired he has a little bit of anxiety issues it sounds like he does not really like being retired that much.    Cardiac testing:  ECHO 5/31/16  There is normal left ventricular systolic function.   There is borderline left atrial enlargement.   There is evidence of borderline pulmonary hypertension.   The EF 4ch was calculated at 64%    Past Medical History:   Diagnosis Date   • Anxiety    • Arthritis    • Back pain    • Depression    • Disease of thyroid gland    • Environmental allergies    • Hyperlipidemia    • Prostate cancer (HCC)        Past Surgical History:   Procedure Laterality Date   • COLONOSCOPY N/A 7/13/2020    Procedure: COLONOSCOPY TO CECUM with bx;  Surgeon: Dinesh Starr MD;  Location: [facility] ENDOSCOPY;  Service: General;  Laterality: N/A;  PREOP/ melena, diarrhea  POSTOP/ diverticulosis, non specific colitis, proctocolitis   • ENDOSCOPY N/A 7/13/2020    Procedure: ESOPHAGOGASTRODUODENOSCOPY WITH BIOPSY;  Surgeon: Dinesh Starr MD;  Location: [facility] ENDOSCOPY;  Service: General;  Laterality: N/A;  PREOP/ abdominal pain  POSTOP/ gastritis, duodenitis, esophagitis   • PROSTATECTOMY     • TONSILLECTOMY         Medications Prior to Admission   Medication Sig Dispense Refill Last Dose   • ALPRAZolam (XANAX) 0.5 MG tablet alprazolam 0.5 mg tablet   TAKE 1 TABLET EVERY DAY FOR ANXIETY ATTACK   Past Month at Unknown time   • aspirin 325 MG tablet Take 325 mg by mouth daily.   7/24/2022 at Unknown time   • carvedilol (COREG) 6.25 MG tablet Take 6.25 mg by mouth 2 (Two) Times a Day.  2 7/23/2022 at Unknown time   • citalopram (CeleXA) 20 MG tablet Take  by  mouth.   2022 at Unknown time   • ezetimibe (ZETIA) 10 MG tablet Take 10 mg by mouth daily.   2022 at Unknown time   • levocetirizine (XYZAL) 5 MG tablet Take 5 mg by mouth every evening.   2022 at Unknown time   • levothyroxine (SYNTHROID, LEVOTHROID) 50 MCG tablet Take  by mouth.   2022 at Unknown time   • lisinopril (PRINIVIL,ZESTRIL) 20 MG tablet Take 20 mg by mouth 2 (Two) Times a Day.   2022 at Unknown time   • rosuvastatin (CRESTOR) 40 MG tablet Take 40 mg by mouth Daily.   2022 at Unknown time   • Testosterone Cypionate 200 MG/ML solution testosterone cypionate 200 mg/mL intramuscular oil   2022 at Unknown time   • traMADol (ULTRAM) 50 MG tablet tramadol 50 mg tablet   Take 2 tablets 3 times a day by oral route.   Past Week at Unknown time       Current Meds  Scheduled Meds:aspirin, 325 mg, Oral, Daily  carvedilol, 6.25 mg, Oral, BID  cetirizine, 10 mg, Oral, Daily  citalopram, 20 mg, Oral, Daily  levothyroxine, 50 mcg, Oral, Q AM  rosuvastatin, 40 mg, Oral, Daily      Continuous Infusions:   PRN Meds:.•  acetaminophen  •  ALPRAZolam  •  melatonin  •  nitroglycerin  •  ondansetron **OR** ondansetron  •  [COMPLETED] Insert peripheral IV **AND** sodium chloride  •  traMADol    Allergies as of 2022 - Reviewed 2022   Allergen Reaction Noted   • Dust mite extract  2016   • Grass  2016   • Molds & smuts Other (See Comments) 2014       Social History     Socioeconomic History   • Marital status:    Tobacco Use   • Smoking status: Former Smoker     Start date:      Quit date: 1970     Years since quittin.5   • Smokeless tobacco: Never Used   Substance and Sexual Activity   • Alcohol use: Yes     Comment: 2 drinks daily   • Drug use: No   • Sexual activity: Defer       Family History   Problem Relation Age of Onset   • No Known Problems Mother    • Arthritis Father    • Cancer Father    • Heart disease Father    • Parkinsonism Father   "  • No Known Problems Maternal Grandmother    • No Known Problems Maternal Grandfather    • No Known Problems Paternal Grandmother    • No Known Problems Paternal Grandfather        REVIEW OF SYSTEMS:   ROS was performed and is negative except as outlined in HPI     REVIEW OF SYSTEMS:   CONSTITUTIONAL: No weight loss, fever, chills, weakness or fatigue.   HEENT: Eyes: No visual loss, blurred vision, double vision or yellow sclerae. Ears, Nose, Throat: No hearing loss, sneezing, congestion, runny nose or sore throat.   SKIN: No rash or itching.     RESPIRATORY: No shortness of breath, hemoptysis, cough or sputum.   GASTROINTESTINAL: No anorexia, nausea, vomiting or diarrhea. No abdominal pain, bright red blood per rectum or melena.  GENITOURINARY: No burning on urination, hematuria or increased frequency.  NEUROLOGICAL: No headache, dizziness, syncope, paralysis, ataxia, numbness or tingling in the extremities. No change in bowel or bladder control.   MUSCULOSKELETAL: No muscle, back pain, joint pain or stiffness.   HEMATOLOGIC: No anemia, bleeding or bruising.   LYMPHATICS: No enlarged nodes. No history of splenectomy.   PSYCHIATRIC: No history of depression, anxiety, hallucinations.   ENDOCRINOLOGIC: No reports of sweating, cold or heat intolerance. No polyuria or polydipsia.        Objective:   Temp:  [97.8 °F (36.6 °C)-98.8 °F (37.1 °C)] 97.8 °F (36.6 °C)  Heart Rate:  [52-65] 60  Resp:  [16] 16  BP: (128-167)/() 153/99  Body mass index is 26.44 kg/m².  Flowsheet Rows    Flowsheet Row First Filed Value   Admission Height 177.8 cm (70\") Documented at 07/24/2022 0541   Admission Weight 83.6 kg (184 lb 4.8 oz) Documented at 07/24/2022 0541        Vitals:    07/24/22 0940   BP: 153/99   Pulse: 60   Resp:    Temp:    SpO2: 96%       Head:    Normocephalic, without obvious abnormality, atraumatic   Eyes:            Lids and lashes normal, conjunctivae and sclerae normal, no   icterus, no pallor   Ears:    Ears " appear intact with no abnormalities noted   Throat:   No oral lesions, dentition good   Neck:   No adenopathy, supple, trachea midline, no thyromegaly, no   carotid bruit, no JVD   Lungs:     Breath sounds are equal and clear to auscultation    Heart:    Normal S1 and S2, RRR, No M/G/R   Abdomen:    Normal bowel sounds, no masses, no organomegaly, soft, nontender,       nondistended, no guarding   Extremities:   Moves all extremities well, no edema, no cyanosis, no redness   Pulses:   Pulses palpable and equal bilaterally.    Skin:  Psychiatric:   No bleeding, bruising or rash    Awake, alert and oriented x 3, normal mood and affect           EKG      Baseline EKG      I personally viewed and interpreted the patient's EKG/Telemetry data    Assessment:  Active Hospital Problems    Diagnosis  POA   • **Chest pain [R07.9]  Yes   • Gastroesophageal reflux disease [K21.9]  Yes   • MONTSERRAT (generalized anxiety disorder) [F41.1]  Yes   • Benign essential hypertension [I10]  Yes   • Hypothyroidism [E03.9]  Yes      Resolved Hospital Problems   No resolved problems to display.       Plan: This is a gentleman who is 82 years old with hypertension and history of hyperlipidemia but no history of vascular disease and he had this episode that woke him up at nighttime his evaluation so far has been negative no abnormal ECG his exam is normal I think we can get a regular treadmill on him and see how he does and I think it will be normal when it is then I think I would recommend that we stop his aspirin the guidelines would actually say he should not take it and that we give him some medicine for reflux and let him go home and follow-up with his primary care doctor    Braxton Medina MD  07/24/22  11:11 EDT.

## 2022-07-24 NOTE — ED PROVIDER NOTES
EMERGENCY DEPARTMENT ENCOUNTER    Room Number:  LUIS CARLOS/LUIS CARLOS  Date of encounter:  7/24/2022  PCP: Willy Almeida MD  Historian: Patient      HPI:  Chief Complaint: Chest pain  A complete HPI/ROS/PMH/PSH/SH/FH are unobtainable due to: None    Context: Willy Saenz is a 82 y.o. male who presents to the ED c/o chest pain with diaphoresis that occurred around 2 AM.  Lasted for approximately 45 minutes.  EMS gave aspirin.  Patient is currently expressing no chest pain.  States he had a negative stress test about 15 years ago.  Patient has history of hypertension, hyperlipidemia.  States that pain was squeezing and tight.  Made her feel a bit nauseous.  No vomiting.  Patient was diaphoretic.  States that pain radiated to his left arm      PAST MEDICAL HISTORY  Active Ambulatory Problems     Diagnosis Date Noted   • Epigastric pain 07/09/2020   • Diarrhea 07/09/2020   • Colitis 07/09/2020   • Abnormal CT scan, sigmoid colon 07/09/2020   • Spondylosis without myelopathy 06/06/2018   • Early satiety 10/09/2017   • Proteins serum plasma low 10/09/2017   • Abnormal magnetic resonance imaging of head 05/17/2019   • Anxiety 06/04/2015   • Benign essential hypertension 08/26/2013   • Benign neoplasm of colon 05/25/2014   • Bilateral high frequency sensorineural hearing loss 06/03/2021   • Blood glucose abnormal 05/17/2019   • Blood in urine 04/29/2021   • Chest pain 05/09/2016   • Contusion of chest 06/08/2021   • Coronary artery disease involving native coronary artery of native heart without angina pectoris 10/29/2018   • COVID-19 12/22/2020   • Depressive disorder 03/19/2021   • Diverticular disease of colon 10/09/2017   • Dyslipidemia 07/20/2021   • ED (erectile dysfunction) of organic origin 05/25/2014   • Elevated LFTs 10/29/2018   • Extremity pain 10/22/2021   • Fatigue 10/09/2017   • MONTSERRAT (generalized anxiety disorder) 01/09/2014   • Gastroesophageal reflux disease 03/19/2021   • Hammer toe 10/09/2017   • History  of skin cancer 10/29/2018   • Hypogonadism 08/14/2018   • Hypothyroidism 08/26/2013   • Immunodeficiency disorder (HCC) 10/16/2017   • Impacted cerumen 10/09/2017   • Injury of foot 05/05/2021   • Kidney cysts 10/22/2021   • Laceration 08/21/2017   • Left lower lobe pneumonia 08/14/2018   • Low blood pressure 08/13/2018   • Low testosterone in male 10/29/2018   • Pain in the coccyx 08/10/2012   • Malignant neoplasm of skin 10/09/2017   • Mitral valve disorder 04/11/2016   • Mixed hyperlipidemia 08/26/2013   • Myositis 07/25/2016   • Non-seasonal allergic rhinitis 10/09/2017   • Olecranon bursitis of left elbow 04/17/2018   • Pain of left thumb 02/04/2020   • Prediabetes 10/29/2018   • Primary malignant neoplasm of prostate (HCC) 07/25/2016   • Pulmonary hypertension (HCC) 10/09/2017   • Renal failure syndrome 03/25/2021   • Renal function test abnormal 05/17/2019   • Seasonal allergies 03/19/2021   • Secondary insomnia 10/29/2018   • Testicular hypofunction 07/25/2016   • Tinnitus of both ears 10/09/2017   • Urinary tract infectious disease 04/29/2021   • Wound of skin 04/24/2017   • Chronic constipation 10/26/2017   • Chronic esophagitis 05/17/2019   • Irritable bowel syndrome 08/26/2013   • Parkinson's disease (HCC) 10/22/2021     Resolved Ambulatory Problems     Diagnosis Date Noted   • Hereditary essential tremor 06/03/2021     Past Medical History:   Diagnosis Date   • Arthritis    • Back pain    • Depression    • Disease of thyroid gland    • Environmental allergies    • Hyperlipidemia    • Prostate cancer (HCC)          PAST SURGICAL HISTORY  Past Surgical History:   Procedure Laterality Date   • COLONOSCOPY N/A 7/13/2020    Procedure: COLONOSCOPY TO CECUM with bx;  Surgeon: Dinesh Starr MD;  Location: [facility] ENDOSCOPY;  Service: General;  Laterality: N/A;  PREOP/ melena, diarrhea  POSTOP/ diverticulosis, non specific colitis, proctocolitis   • ENDOSCOPY N/A 7/13/2020    Procedure:  ESOPHAGOGASTRODUODENOSCOPY WITH BIOPSY;  Surgeon: Dinesh Starr MD;  Location: [facility] ENDOSCOPY;  Service: General;  Laterality: N/A;  PREOP/ abdominal pain  POSTOP/ gastritis, duodenitis, esophagitis   • PROSTATECTOMY     • TONSILLECTOMY           FAMILY HISTORY  Family History   Problem Relation Age of Onset   • No Known Problems Mother    • Arthritis Father    • Cancer Father    • Heart disease Father    • Parkinsonism Father    • No Known Problems Maternal Grandmother    • No Known Problems Maternal Grandfather    • No Known Problems Paternal Grandmother    • No Known Problems Paternal Grandfather          SOCIAL HISTORY  Social History     Socioeconomic History   • Marital status:    Tobacco Use   • Smoking status: Former Smoker     Start date:      Quit date: 1970     Years since quittin.5   • Smokeless tobacco: Never Used   Substance and Sexual Activity   • Alcohol use: Yes     Comment: 2 drinks daily   • Drug use: No   • Sexual activity: Defer         ALLERGIES  Dust mite extract, Grass, and Molds & smuts        REVIEW OF SYSTEMS  Review of Systems     All systems reviewed and negative except for those discussed in HPI.       PHYSICAL EXAM    I have reviewed the triage vital signs and nursing notes.    ED Triage Vitals [22 0315]   Temp Heart Rate Resp BP SpO2   98.3 °F (36.8 °C) 57 16 141/82 97 %      Temp src Heart Rate Source Patient Position BP Location FiO2 (%)   -- -- -- -- --       Physical Exam  GENERAL: not distressed  HENT: nares patent  EYES: no scleral icterus  CV: regular rhythm, regular rate  RESPIRATORY: normal effort clear to auscultation bilaterally  ABDOMEN: soft, nontender nondistended  MUSCULOSKELETAL: no deformity  NEURO: alert, moves all extremities, follows commands  SKIN: warm, dry        LAB RESULTS  Recent Results (from the past 24 hour(s))   ECG 12 Lead    Collection Time: 22  3:25 AM   Result Value Ref Range    QT Interval 439 ms   Comprehensive  Metabolic Panel    Collection Time: 07/24/22  3:29 AM    Specimen: Blood   Result Value Ref Range    Glucose 98 65 - 99 mg/dL    BUN 22 8 - 23 mg/dL    Creatinine 1.13 0.76 - 1.27 mg/dL    Sodium 143 136 - 145 mmol/L    Potassium 4.1 3.5 - 5.2 mmol/L    Chloride 109 (H) 98 - 107 mmol/L    CO2 22.9 22.0 - 29.0 mmol/L    Calcium 8.5 (L) 8.6 - 10.5 mg/dL    Total Protein 5.8 (L) 6.0 - 8.5 g/dL    Albumin 3.60 3.50 - 5.20 g/dL    ALT (SGPT) 29 1 - 41 U/L    AST (SGOT) 26 1 - 40 U/L    Alkaline Phosphatase 45 39 - 117 U/L    Total Bilirubin 0.3 0.0 - 1.2 mg/dL    Globulin 2.2 gm/dL    A/G Ratio 1.6 g/dL    BUN/Creatinine Ratio 19.5 7.0 - 25.0    Anion Gap 11.1 5.0 - 15.0 mmol/L    eGFR 65.7 >60.0 mL/min/1.73   Protime-INR    Collection Time: 07/24/22  3:29 AM    Specimen: Blood   Result Value Ref Range    Protime 14.2 11.7 - 14.2 Seconds    INR 1.11 (H) 0.90 - 1.10   aPTT    Collection Time: 07/24/22  3:29 AM    Specimen: Blood   Result Value Ref Range    PTT 32.4 22.7 - 35.4 seconds   Troponin    Collection Time: 07/24/22  3:29 AM    Specimen: Blood   Result Value Ref Range    Troponin T <0.010 0.000 - 0.030 ng/mL   BNP    Collection Time: 07/24/22  3:29 AM    Specimen: Blood   Result Value Ref Range    proBNP 284.0 0.0 - 1,800.0 pg/mL   CBC Auto Differential    Collection Time: 07/24/22  3:29 AM    Specimen: Blood   Result Value Ref Range    WBC 8.04 3.40 - 10.80 10*3/mm3    RBC 4.39 4.14 - 5.80 10*6/mm3    Hemoglobin 14.1 13.0 - 17.7 g/dL    Hematocrit 40.9 37.5 - 51.0 %    MCV 93.2 79.0 - 97.0 fL    MCH 32.1 26.6 - 33.0 pg    MCHC 34.5 31.5 - 35.7 g/dL    RDW 14.0 12.3 - 15.4 %    RDW-SD 47.4 37.0 - 54.0 fl    MPV 10.7 6.0 - 12.0 fL    Platelets 212 140 - 450 10*3/mm3    Neutrophil % 51.8 42.7 - 76.0 %    Lymphocyte % 27.4 19.6 - 45.3 %    Monocyte % 12.3 (H) 5.0 - 12.0 %    Eosinophil % 7.0 (H) 0.3 - 6.2 %    Basophil % 1.1 0.0 - 1.5 %    Immature Grans % 0.4 0.0 - 0.5 %    Neutrophils, Absolute 4.17 1.70 - 7.00  10*3/mm3    Lymphocytes, Absolute 2.20 0.70 - 3.10 10*3/mm3    Monocytes, Absolute 0.99 (H) 0.10 - 0.90 10*3/mm3    Eosinophils, Absolute 0.56 (H) 0.00 - 0.40 10*3/mm3    Basophils, Absolute 0.09 0.00 - 0.20 10*3/mm3    Immature Grans, Absolute 0.03 0.00 - 0.05 10*3/mm3    nRBC 0.0 0.0 - 0.2 /100 WBC   COVID-19,[facility] IN-HOUSE CEPHEID/ALONZO NP SWAB IN TRANSPORT MEDIA 8-12 HR TAT - Swab, Nasopharynx    Collection Time: 07/24/22  4:25 AM    Specimen: Nasopharynx; Swab   Result Value Ref Range    COVID19 Not Detected Not Detected - Ref. Range       Ordered the above labs and independently reviewed the results.        RADIOLOGY  XR Chest 1 View    Result Date: 7/24/2022  SINGLE VIEW OF THE CHEST  HISTORY: Chest pain  COMPARISON: 12/08/2020  FINDINGS: Heart size is within normal limits. No pneumothorax, pleural effusion, or acute infiltrate is seen. There is some calcification of the aorta.      No acute findings.  This report was finalized on 7/24/2022 3:52 AM by Dr. Raissa Nolasco M.D.        I ordered the above noted radiological studies. Reviewed by me and discussed with radiologist.  See dictation for official radiology interpretation.      PROCEDURES    Procedures      MEDICATIONS GIVEN IN ER    Medications   sodium chloride 0.9 % flush 10 mL (has no administration in time range)   nitroglycerin (NITROSTAT) SL tablet 0.4 mg (0.4 mg Sublingual Given 7/24/22 0419)   acetaminophen (TYLENOL) tablet 650 mg (has no administration in time range)   ondansetron (ZOFRAN) tablet 4 mg (has no administration in time range)     Or   ondansetron (ZOFRAN) injection 4 mg (has no administration in time range)   melatonin tablet 3 mg (has no administration in time range)         PROGRESS, DATA ANALYSIS, CONSULTS, AND MEDICAL DECISION MAKING    All labs have been independently reviewed by me.  All radiology studies have been reviewed by me and discussed with radiologist dictating the report.   EKG's independently viewed and  interpreted by me.  Discussion below represents my analysis of pertinent findings related to patient's condition, differential diagnosis, treatment plan and final disposition.        ED Course as of 07/24/22 0536   Sun Jul 24, 2022   0327 EKG          EKG time: 0325  Rhythm/Rate: Sinus rhythm rate 57  P waves and NH: Normal  QRS, axis: Regular normal axis  ST and T waves: Normal    Interpreted Contemporaneously by me, independently viewed  No significant changed compared to prior EKG from 8/13/2018   [TJ]   0419 EKG          EKG time: 0416  Rhythm/Rate: Sinus rhythm rate 57  P waves and NH: Normal  QRS, axis: Narrow regular normal axis  ST and T waves: Normal    Interpreted Contemporaneously by me, independently viewed [TJ]      ED Course User Index  [TJ] Kendrick Moe MD           PPE: The patient wore a surgical mask throughout the entire patient encounter. I wore an N95.    AS OF 05:36 EDT VITALS:    BP - 130/86  HR - 65  TEMP - 98.3 °F (36.8 °C)  O2 SATS - 92%        DIAGNOSIS  Final diagnoses:   Chest pain, unspecified type         DISPOSITION  Admitted           Kendrick Moe MD  07/24/22 0536

## 2022-07-24 NOTE — H&P
Patient Name:  Willy Saenz  YOB: 1940  MRN:  [id number]  Admit Date:  7/24/2022  Patient Care Team:  Willy Almeida MD as PCP - General (Internal Medicine)      Subjective   History Present Illness     Chief Complaint   Patient presents with   • Chest Pain       Mr. Saenz is a 82 y.o. male with a history of hypertension, anxiety, GERD, hypothyroidism that presents to [facility] complaining of chest pain with associated diaphoresis and nausea.  His chest pain began about 1 AM.  It is described as tightness and aching and located primarily on the left side of his chest.  It does not radiate anywhere.  It awoke him from sleep.  He also felt nauseated and sweaty.  He denies any shortness of breath.  He felt a little bit dizzy at the time.  He received nitroglycerin and aspirin in the ED.  His chest pain has since resolved.  He has not experienced an episode like this previously.  He denies any fever or chills.  Prior to awaking with chest pain early this morning, he had been feeling very well in the days prior.    He has a history of hypothyroidism, hyperlipidemia, prostate cancer, GERD and high blood pressure.  He has some general anxiety.  Last year he was diagnosed with Parkinson's disease and received a second opinion which test confirmed he did not have Parkinson's disease.  He recovered from COVID-19 about 10 months ago.    Review of Systems   Constitutional: Positive for diaphoresis. Negative for chills, fatigue and fever.   HENT: Positive for rhinorrhea. Negative for congestion.    Respiratory: Positive for cough. Negative for shortness of breath.    Cardiovascular: Positive for chest pain. Negative for palpitations and leg swelling.   Gastrointestinal: Positive for nausea. Negative for abdominal pain, constipation, diarrhea and vomiting.   Genitourinary: Negative for difficulty urinating.   Musculoskeletal: Negative for arthralgias and myalgias.   Skin: Negative  for rash and wound.   Neurological: Positive for dizziness. Negative for light-headedness and headaches.   Psychiatric/Behavioral:        Reporting anxiety        Personal History     Past Medical History:   Diagnosis Date   • Anxiety    • Arthritis    • Back pain    • Depression    • Disease of thyroid gland    • Environmental allergies    • Hyperlipidemia    • Prostate cancer (HCC)      Past Surgical History:   Procedure Laterality Date   • COLONOSCOPY N/A 2020    Procedure: COLONOSCOPY TO CECUM with bx;  Surgeon: Dinesh Starr MD;  Location: [facility] ENDOSCOPY;  Service: General;  Laterality: N/A;  PREOP/ melena, diarrhea  POSTOP/ diverticulosis, non specific colitis, proctocolitis   • ENDOSCOPY N/A 2020    Procedure: ESOPHAGOGASTRODUODENOSCOPY WITH BIOPSY;  Surgeon: Dinesh Starr MD;  Location: [facility] ENDOSCOPY;  Service: General;  Laterality: N/A;  PREOP/ abdominal pain  POSTOP/ gastritis, duodenitis, esophagitis   • PROSTATECTOMY     • TONSILLECTOMY       Family History   Problem Relation Age of Onset   • No Known Problems Mother    • Arthritis Father    • Cancer Father    • Heart disease Father    • Parkinsonism Father    • No Known Problems Maternal Grandmother    • No Known Problems Maternal Grandfather    • No Known Problems Paternal Grandmother    • No Known Problems Paternal Grandfather      Social History     Tobacco Use   • Smoking status: Former Smoker     Start date:      Quit date: 1970     Years since quittin.5   • Smokeless tobacco: Never Used   Substance Use Topics   • Alcohol use: Yes     Comment: 2 drinks daily   • Drug use: No     No current facility-administered medications on file prior to encounter.     Current Outpatient Medications on File Prior to Encounter   Medication Sig Dispense Refill   • ALPRAZolam (XANAX) 0.5 MG tablet alprazolam 0.5 mg tablet   TAKE 1 TABLET EVERY DAY FOR ANXIETY ATTACK     • aspirin 325 MG tablet Take 325 mg by mouth daily.     •  carvedilol (COREG) 6.25 MG tablet Take 6.25 mg by mouth 2 (Two) Times a Day.  2   • citalopram (CeleXA) 20 MG tablet Take  by mouth.     • ezetimibe (ZETIA) 10 MG tablet Take 10 mg by mouth daily.     • levocetirizine (XYZAL) 5 MG tablet Take 5 mg by mouth every evening.     • levothyroxine (SYNTHROID, LEVOTHROID) 50 MCG tablet Take  by mouth.     • lisinopril (PRINIVIL,ZESTRIL) 20 MG tablet Take 20 mg by mouth 2 (Two) Times a Day.     • rosuvastatin (CRESTOR) 40 MG tablet Take 40 mg by mouth Daily.     • Testosterone Cypionate 200 MG/ML solution testosterone cypionate 200 mg/mL intramuscular oil     • traMADol (ULTRAM) 50 MG tablet tramadol 50 mg tablet   Take 2 tablets 3 times a day by oral route.     • [DISCONTINUED] OMEPRAZOLE PO omeprazole 20 mg-clarithromycin 500 mg-amoxicill 500 mg(40) combo pack   Take by oral route.     • [DISCONTINUED] triamcinolone (KENALOG) 0.025 % ointment triamcinolone acetonide 0.025 % topical ointment       Allergies   Allergen Reactions   • Dust Mite Extract    • Grass    • Molds & Smuts Other (See Comments)       Objective    Objective     Vital Signs  Temp:  [97.8 °F (36.6 °C)-98.8 °F (37.1 °C)] 97.8 °F (36.6 °C)  Heart Rate:  [52-65] 60  Resp:  [16] 16  BP: (128-167)/() 153/99  SpO2:  [92 %-97 %] 96 %  on   ;   Device (Oxygen Therapy): room air  Body mass index is 26.44 kg/m².    Physical Exam  Constitutional:       General: He is not in acute distress.     Appearance: He is not ill-appearing, toxic-appearing or diaphoretic.      Comments: Well-appearing, appears younger than stated age   HENT:      Head: Normocephalic and atraumatic.      Mouth/Throat:      Mouth: Mucous membranes are moist.   Eyes:      Extraocular Movements: Extraocular movements intact.      Conjunctiva/sclera: Conjunctivae normal.      Pupils: Pupils are equal, round, and reactive to light.   Cardiovascular:      Rate and Rhythm: Normal rate and regular rhythm.      Pulses: Normal pulses.      Heart  sounds: Normal heart sounds. No murmur heard.    No gallop.   Pulmonary:      Effort: Pulmonary effort is normal. No respiratory distress.      Breath sounds: Normal breath sounds.   Abdominal:      General: Bowel sounds are normal. There is no distension.      Palpations: Abdomen is soft.      Tenderness: There is no abdominal tenderness.   Musculoskeletal:         General: No swelling or tenderness. Normal range of motion.      Cervical back: Normal range of motion.   Skin:     General: Skin is warm and dry.   Neurological:      General: No focal deficit present.      Mental Status: He is alert.   Psychiatric:         Mood and Affect: Mood normal.         Results Review:  I reviewed the patient's new clinical results.      Lab Results (last 24 hours)     Procedure Component Value Units Date/Time    CBC & Differential [[id number]]  (Abnormal) Collected: 07/24/22 0329    Specimen: Blood Updated: 07/24/22 0344    Narrative:      The following orders were created for panel order CBC & Differential.  Procedure                               Abnormality         Status                     ---------                               -----------         ------                     CBC Auto Differential[[id number]]        Abnormal            Final result                 Please view results for these tests on the individual orders.    Comprehensive Metabolic Panel [[id number]]  (Abnormal) Collected: 07/24/22 0329    Specimen: Blood Updated: 07/24/22 0418     Glucose 98 mg/dL      BUN 22 mg/dL      Creatinine 1.13 mg/dL      Sodium 143 mmol/L      Potassium 4.1 mmol/L      Chloride 109 mmol/L      CO2 22.9 mmol/L      Calcium 8.5 mg/dL      Total Protein 5.8 g/dL      Albumin 3.60 g/dL      ALT (SGPT) 29 U/L      AST (SGOT) 26 U/L      Alkaline Phosphatase 45 U/L      Total Bilirubin 0.3 mg/dL      Globulin 2.2 gm/dL      A/G Ratio 1.6 g/dL      BUN/Creatinine Ratio 19.5     Anion Gap 11.1 mmol/L      eGFR 65.7 mL/min/1.73       Comment: National Kidney Foundation and American Society of Nephrology (ASN) Task Force recommended calculation based on the Chronic Kidney Disease Epidemiology Collaboration (CKD-EPI) equation refit without adjustment for race.       Narrative:      GFR Normal >60  Chronic Kidney Disease <60  Kidney Failure <15      Protime-INR [[id number]]  (Abnormal) Collected: 07/24/22 0329    Specimen: Blood Updated: 07/24/22 0409     Protime 14.2 Seconds      INR 1.11    aPTT [[id number]]  (Normal) Collected: 07/24/22 0329    Specimen: Blood Updated: 07/24/22 0409     PTT 32.4 seconds     Troponin [[id number]]  (Normal) Collected: 07/24/22 0329    Specimen: Blood Updated: 07/24/22 0416     Troponin T <0.010 ng/mL     Narrative:      Troponin T Reference Range:  <= 0.03 ng/mL-   Negative for AMI  >0.03 ng/mL-     Abnormal for myocardial necrosis.  Clinicians would have to utilize clinical acumen, EKG, Troponin and serial changes to determine if it is an Acute Myocardial Infarction or myocardial injury due to an underlying chronic condition.       Results may be falsely decreased if patient taking Biotin.      BNP [[id number]]  (Normal) Collected: 07/24/22 0329    Specimen: Blood Updated: 07/24/22 0415     proBNP 284.0 pg/mL     Narrative:      Among patients with dyspnea, NT-proBNP is highly sensitive for the detection of acute congestive heart failure. In addition NT-proBNP of <300 pg/ml effectively rules out acute congestive heart failure with 99% negative predictive value.    Results may be falsely decreased if patient taking Biotin.      CBC Auto Differential [[id number]]  (Abnormal) Collected: 07/24/22 0329    Specimen: Blood Updated: 07/24/22 0344     WBC 8.04 10*3/mm3      RBC 4.39 10*6/mm3      Hemoglobin 14.1 g/dL      Hematocrit 40.9 %      MCV 93.2 fL      MCH 32.1 pg      MCHC 34.5 g/dL      RDW 14.0 %      RDW-SD 47.4 fl      MPV 10.7 fL      Platelets 212 10*3/mm3      Neutrophil % 51.8 %      Lymphocyte % 27.4 %       Monocyte % 12.3 %      Eosinophil % 7.0 %      Basophil % 1.1 %      Immature Grans % 0.4 %      Neutrophils, Absolute 4.17 10*3/mm3      Lymphocytes, Absolute 2.20 10*3/mm3      Monocytes, Absolute 0.99 10*3/mm3      Eosinophils, Absolute 0.56 10*3/mm3      Basophils, Absolute 0.09 10*3/mm3      Immature Grans, Absolute 0.03 10*3/mm3      nRBC 0.0 /100 WBC     COVID PRE-OP / PRE-PROCEDURE SCREENING ORDER (NO ISOLATION) - Swab, Nasopharynx [[id number]]  (Normal) Collected: 07/24/22 0425    Specimen: Swab from Nasopharynx Updated: 07/24/22 0456    Narrative:      The following orders were created for panel order COVID PRE-OP / PRE-PROCEDURE SCREENING ORDER (NO ISOLATION) - Swab, Nasopharynx.  Procedure                               Abnormality         Status                     ---------                               -----------         ------                     COVID-19,[facility] IN-HOUSE...[[id number]]  Normal              Final result                 Please view results for these tests on the individual orders.    COVID-19,[facility]-HOU[facility] CEPHEID/ALONZO NP SWAB IN TRANSPORT MEDIA 8-12 HR TAT - Swab, Nasopharynx [[id number]]  (Normal) Collected: 07/24/22 0425    Specimen: Swab from Nasopharynx Updated: 07/24/22 0456     COVID19 Not Detected    Narrative:      Fact sheet for providers: https://www.fda.gov/media/081137/download    Fact sheet for patients: https://www.fda.gov/media/823793/download    Test performed by PCR.    Troponin [[id number]]  (Normal) Collected: 07/24/22 0559    Specimen: Blood Updated: 07/24/22 0640     Troponin T <0.010 ng/mL     Narrative:      Troponin T Reference Range:  <= 0.03 ng/mL-   Negative for AMI  >0.03 ng/mL-     Abnormal for myocardial necrosis.  Clinicians would have to utilize clinical acumen, EKG, Troponin and serial changes to determine if it is an Acute Myocardial Infarction or myocardial injury due to an underlying chronic condition.       Results may be falsely decreased if patient  taking Biotin.      Basic Metabolic Panel [[id number]]  (Abnormal) Collected: 07/24/22 0559    Specimen: Blood Updated: 07/24/22 0640     Glucose 107 mg/dL      BUN 24 mg/dL      Creatinine 1.28 mg/dL      Sodium 140 mmol/L      Potassium 4.6 mmol/L      Chloride 105 mmol/L      CO2 23.3 mmol/L      Calcium 9.3 mg/dL      BUN/Creatinine Ratio 18.8     Anion Gap 11.7 mmol/L      eGFR 56.6 mL/min/1.73      Comment: National Kidney Foundation and American Society of Nephrology (ASN) Task Force recommended calculation based on the Chronic Kidney Disease Epidemiology Collaboration (CKD-EPI) equation refit without adjustment for race.       Narrative:      GFR Normal >60  Chronic Kidney Disease <60  Kidney Failure <15      CBC (No Diff) [[id number]]  (Normal) Collected: 07/24/22 0559    Specimen: Blood Updated: 07/24/22 0612     WBC 8.84 10*3/mm3      RBC 4.48 10*6/mm3      Hemoglobin 14.2 g/dL      Hematocrit 42.4 %      MCV 94.6 fL      MCH 31.7 pg      MCHC 33.5 g/dL      RDW 13.9 %      RDW-SD 47.8 fl      MPV 10.9 fL      Platelets 220 10*3/mm3           Imaging Results (Last 24 Hours)     Procedure Component Value Units Date/Time    XR Chest 1 View [[id number]] Collected: 07/24/22 0351     Updated: 07/24/22 0355    Narrative:      SINGLE VIEW OF THE CHEST     HISTORY: Chest pain     COMPARISON: 12/08/2020     FINDINGS:  Heart size is within normal limits. No pneumothorax, pleural effusion,  or acute infiltrate is seen. There is some calcification of the aorta.       Impression:      No acute findings.     This report was finalized on 7/24/2022 3:52 AM by Dr. Raissa Nolasco M.D.                 ECG 12 Lead   Final Result   HEART RATE= 57  bpm   RR Interval= 1053  ms   WY Interval= 156  ms   P Horizontal Axis= 9  deg   P Front Axis= 49  deg   QRSD Interval= 107  ms   QT Interval= 439  ms   QRS Axis= 42  deg   T Wave Axis= 61  deg   - OTHERWISE NORMAL ECG -   Sinus rhythm   No change from previous tracing    Electronically Signed By: Braxton Medina ([facility]) ([facility]) 24-Jul-2022 08:39:08   Date and Time of Study: 2022-07-24 03:25:19           Assessment/Plan     Active Hospital Problems    Diagnosis  POA   • **Chest pain [R07.9]  Yes   • Gastroesophageal reflux disease [K21.9]  Yes   • MONTSERRAT (generalized anxiety disorder) [F41.1]  Yes   • Benign essential hypertension [I10]  Yes   • Hypothyroidism [E03.9]  Yes      Resolved Hospital Problems   No resolved problems to display.     Troponin negative x2, EKG normal sinus rhythm  BNP normal  Creatinine 1.28, up from 1.13, BUN 24  CBC unremarkable  COVID-19 negative  Chest x-ray negative acute      Chest pain since resolved.  Work-up so far negative.  We will reorder his home medications.  Check D-dimer.  Discussed with Dr. Medina, plan is for treadmill stress test.  Will stop aspirin per cardiology rec.      · I discussed the patient's findings and my recommendations with patient and family.    VTE Prophylaxis - SCDs.  Code Status - Full code.       BEATRICE Shrestha  [facility]  07/24/22  11:42 EDT

## 2022-07-24 NOTE — DISCHARGE SUMMARY
Willy Saenz  [id number]    Date of Admit: 7/24/2022  Date of Discharge:  7/24/2022    Discharge Diagnosis:  Active Hospital Problems    Diagnosis  POA   • **Chest pain [R07.9]  Yes   • Gastroesophageal reflux disease [K21.9]  Yes   • MONTSERRAT (generalized anxiety disorder) [F41.1]  Yes   • Benign essential hypertension [I10]  Yes   • Hypothyroidism [E03.9]  Yes      Resolved Hospital Problems   No resolved problems to display.       Hospital Course:  Mr. Saenz is an 82 year old male with a past medical history of hyperlipidemia, prostate cancer, hypertension and hypothyroidism. He is a daily drinker, denies smoking history.    He presented to the emergency department with complaints of chest pain with associated diaphoresis and nausea. EKG on arrival showed SR without acute ST changes. Initial troponin was negative, .0. CXR showed no acute findings.    He had a treadmill stress test today. He exercised for nine minutes without symptoms and no EKG changes.     He is stable for discharge. We are recommending patient stop daily aspirin and abstain from alcohol use. Protonix 40mg nightly started for treatment of reflux. He should follow up with Dr. Dio Almeida in a couple of weeks.     Procedures Performed         Consults     Date and Time Order Name Status Description    7/24/2022  4:51 AM Inpatient Cardiology Consult Completed     7/24/2022  4:21 AM [facility] (on-call MD unless specified) Details            Discharge Medications     Your medication list      START taking these medications      Instructions Last Dose Given Next Dose Due   pantoprazole 40 MG EC tablet  Commonly known as: PROTONIX      Take 1 tablet by mouth Every Night.          CONTINUE taking these medications      Instructions Last Dose Given Next Dose Due   ALPRAZolam 0.5 MG tablet  Commonly known as: XANAX      alprazolam 0.5 mg tablet   TAKE 1 TABLET EVERY DAY FOR ANXIETY ATTACK       carvedilol 6.25 MG tablet  Commonly known as: COREG      Take  6.25 mg by mouth 2 (Two) Times a Day.       citalopram 20 MG tablet  Commonly known as: CeleXA      Take  by mouth.       ezetimibe 10 MG tablet  Commonly known as: ZETIA      Take 10 mg by mouth daily.       levocetirizine 5 MG tablet  Commonly known as: XYZAL      Take 5 mg by mouth every evening.       levothyroxine 50 MCG tablet  Commonly known as: SYNTHROID, LEVOTHROID      Take  by mouth.       lisinopril 20 MG tablet  Commonly known as: PRINIVIL,ZESTRIL      Take 20 mg by mouth 2 (Two) Times a Day.       rosuvastatin 40 MG tablet  Commonly known as: CRESTOR      Take 40 mg by mouth Daily.       Testosterone Cypionate 200 MG/ML solution      testosterone cypionate 200 mg/mL intramuscular oil       traMADol 50 MG tablet  Commonly known as: ULTRAM      tramadol 50 mg tablet   Take 2 tablets 3 times a day by oral route.          STOP taking these medications    aspirin 325 MG tablet              Where to Get Your Medications      These medications were sent to [facility]/pharmacy #[id number] [place], [place] - [address] AT [facility] [phone]  - [phone]   [id number] [place] [address], [place] [address]    Phone: [phone]   · pantoprazole 40 MG EC tablet         Discharge Diet: healthy heart    Activity at Discharge: as tolerated    Discharge disposition: home    Condition on Discharge: stable    Follow-up Appointments  No future appointments.  Additional Instructions for the Follow-ups that You Need to Schedule     Discharge Follow-up with PCP   As directed       Currently Documented PCP:    Willy Almeida MD    PCP Phone Number:    [phone]     Follow Up Details: 2 weeks               Test Results Pending at Discharge       Braxton Medina MD  07/24/22  13:25 EDT

## 2022-07-25 NOTE — CASE MANAGEMENT/SOCIAL WORK
Case Management Discharge Note      Final Note: DC'd home 7/24              Transportation Services  Private: Car    Final Discharge Disposition Code: 01 - home or self-care

## 2022-07-26 ENCOUNTER — TRANSCRIBE ORDERS (OUTPATIENT)
Dept: ADMINISTRATIVE | Facility: HOSPITAL | Age: 82
End: 2022-07-26

## 2022-07-26 DIAGNOSIS — M54.10 RADICULOPATHY, UNSPECIFIED SPINAL REGION: ICD-10-CM

## 2022-07-26 DIAGNOSIS — M54.6 THORACIC SPINE PAIN: ICD-10-CM

## 2022-07-26 DIAGNOSIS — M54.2 NECK PAIN: Primary | ICD-10-CM

## 2022-08-10 ENCOUNTER — HOSPITAL ENCOUNTER (OUTPATIENT)
Dept: CT IMAGING | Facility: HOSPITAL | Age: 82
Discharge: HOME | End: 2022-08-10
Admitting: INTERNAL MEDICINE

## 2022-08-10 ENCOUNTER — TRANSCRIBE ORDERS (OUTPATIENT)
Dept: ADMINISTRATIVE | Facility: HOSPITAL | Age: 82
End: 2022-08-10

## 2022-08-10 DIAGNOSIS — R10.9 ABDOMINAL PAIN, UNSPECIFIED ABDOMINAL LOCATION: Primary | ICD-10-CM

## 2022-08-10 DIAGNOSIS — R10.9 ABDOMINAL PAIN, UNSPECIFIED ABDOMINAL LOCATION: ICD-10-CM

## 2022-08-10 PROCEDURE — 25010000002 IOPAMIDOL 61 % SOLUTION: Performed by: INTERNAL MEDICINE

## 2022-08-10 PROCEDURE — 0 DIATRIZOATE MEGLUMINE & SODIUM PER 1 ML: Performed by: INTERNAL MEDICINE

## 2022-08-10 PROCEDURE — 74177 CT ABD & PELVIS W/CONTRAST: CPT

## 2022-08-10 RX ADMIN — IOPAMIDOL 100 ML: 612 INJECTION, SOLUTION INTRAVENOUS at 12:30

## 2022-08-10 RX ADMIN — DIATRIZOATE MEGLUMINE AND DIATRIZOATE SODIUM 30 ML: 660; 100 LIQUID ORAL; RECTAL at 11:30

## 2022-08-19 ENCOUNTER — HOSPITAL ENCOUNTER (OUTPATIENT)
Dept: MRI IMAGING | Facility: HOSPITAL | Age: 82
Discharge: HOME | End: 2022-08-19

## 2022-08-19 DIAGNOSIS — M54.10 RADICULOPATHY, UNSPECIFIED SPINAL REGION: ICD-10-CM

## 2022-08-19 DIAGNOSIS — M54.2 NECK PAIN: ICD-10-CM

## 2022-08-19 DIAGNOSIS — M54.6 THORACIC SPINE PAIN: ICD-10-CM

## 2022-08-19 PROCEDURE — 72141 MRI NECK SPINE W/O DYE: CPT

## 2022-08-19 PROCEDURE — 72146 MRI CHEST SPINE W/O DYE: CPT

## 2022-08-29 ENCOUNTER — TRANSCRIBE ORDERS (OUTPATIENT)
Dept: ADMINISTRATIVE | Facility: HOSPITAL | Age: 82
End: 2022-08-29

## 2022-08-29 DIAGNOSIS — R19.7 DIARRHEA, UNSPECIFIED TYPE: ICD-10-CM

## 2022-08-29 DIAGNOSIS — R10.10 PAIN OF UPPER ABDOMEN: Primary | ICD-10-CM

## 2022-08-30 ENCOUNTER — HOSPITAL ENCOUNTER (OUTPATIENT)
Dept: NUCLEAR MEDICINE | Facility: HOSPITAL | Age: 82
Discharge: HOME | End: 2022-08-30

## 2022-08-30 DIAGNOSIS — R10.10 PAIN OF UPPER ABDOMEN: ICD-10-CM

## 2022-08-30 DIAGNOSIS — R19.7 DIARRHEA, UNSPECIFIED TYPE: ICD-10-CM

## 2022-08-30 PROCEDURE — 0 TECHNETIUM TC 99M MEBROFENIN KIT: Performed by: INTERNAL MEDICINE

## 2022-08-30 PROCEDURE — 78227 HEPATOBIL SYST IMAGE W/DRUG: CPT

## 2022-08-30 PROCEDURE — 25010000002 SINCALIDE PER 5 MCG: Performed by: INTERNAL MEDICINE

## 2022-08-30 PROCEDURE — A9537 TC99M MEBROFENIN: HCPCS | Performed by: INTERNAL MEDICINE

## 2022-08-30 RX ORDER — KIT FOR THE PREPARATION OF TECHNETIUM TC 99M MEBROFENIN 45 MG/10ML
1 INJECTION, POWDER, LYOPHILIZED, FOR SOLUTION INTRAVENOUS
Status: COMPLETED | OUTPATIENT
Start: 2022-08-30 | End: 2022-08-30

## 2022-08-30 RX ADMIN — MEBROFENIN 1 DOSE: 45 INJECTION, POWDER, LYOPHILIZED, FOR SOLUTION INTRAVENOUS at 08:30

## 2022-08-30 RX ADMIN — SODIUM CHLORIDE: 9 INJECTION, SOLUTION INTRAVENOUS at 10:08

## 2022-11-10 ENCOUNTER — OFFICE VISIT (OUTPATIENT)
Dept: GASTROENTEROLOGY | Facility: CLINIC | Age: 82
End: 2022-11-10

## 2022-11-10 VITALS
SYSTOLIC BLOOD PRESSURE: 90 MMHG | DIASTOLIC BLOOD PRESSURE: 62 MMHG | HEIGHT: 70 IN | BODY MASS INDEX: 26.24 KG/M2 | WEIGHT: 183.3 LBS | TEMPERATURE: 97 F | HEART RATE: 57 BPM | OXYGEN SATURATION: 97 %

## 2022-11-10 DIAGNOSIS — K29.70 GASTRITIS WITHOUT BLEEDING, UNSPECIFIED CHRONICITY, UNSPECIFIED GASTRITIS TYPE: Primary | ICD-10-CM

## 2022-11-10 DIAGNOSIS — K20.90 ESOPHAGITIS: ICD-10-CM

## 2022-11-10 PROCEDURE — 99203 OFFICE O/P NEW LOW 30 MIN: CPT | Performed by: NURSE PRACTITIONER

## 2022-11-10 RX ORDER — CITALOPRAM 20 MG/1
1 TABLET ORAL
COMMUNITY

## 2022-11-10 RX ORDER — PANTOPRAZOLE SODIUM 40 MG/1
40 TABLET, DELAYED RELEASE ORAL NIGHTLY
Qty: 30 TABLET | Refills: 2 | Status: SHIPPED | OUTPATIENT
Start: 2022-11-10

## 2022-11-10 RX ORDER — AMMONIUM LACTATE 12 G/100G
LOTION TOPICAL
COMMUNITY
Start: 2022-11-01

## 2022-11-10 RX ORDER — TESTOSTERONE 16.2 MG/G
GEL TRANSDERMAL
COMMUNITY
Start: 2022-10-10

## 2022-11-10 RX ORDER — SUCRALFATE 1 G/1
1 TABLET ORAL 2 TIMES DAILY
Qty: 60 TABLET | Refills: 2 | Status: SHIPPED | OUTPATIENT
Start: 2022-11-10 | End: 2023-01-25

## 2022-11-10 RX ORDER — TRAMADOL HYDROCHLORIDE 50 MG/1
1 TABLET ORAL
COMMUNITY

## 2022-11-10 RX ORDER — EZETIMIBE 10 MG/1
1 TABLET ORAL
COMMUNITY

## 2022-11-10 RX ORDER — LEVOCETIRIZINE DIHYDROCHLORIDE 5 MG/1
1 TABLET, FILM COATED ORAL
COMMUNITY

## 2022-11-10 RX ORDER — CARVEDILOL 12.5 MG/1
12.5 TABLET ORAL 2 TIMES DAILY
COMMUNITY
Start: 2022-10-24

## 2022-11-10 NOTE — PROGRESS NOTES
"Chief Complaint   Patient presents with   • Abdominal Pain         History of Present Illness  Patient is an 82-year-old male who presents today for evaluation.    Patient presents today with concerns about abdominal pain.  Pain has been present for the last 3 to 4 months.  Pain is located to the mid abdomen and described as a stomachache.  It is present on a constant basis.  At times eating can make it worse but other times it does not.    He has had extensive work-up completed recently to evaluate the pain including CT scan which showed hepatic hemangioma and diverticulosis, otherwise unremarkable from a GI standpoint.  HIDA scan which was normal with ejection fraction of 94%.  Gastric emptying study which was negative.  He underwent EGD which showed a hiatal hernia with esophagitis and gastritis with biopsies negative for H. pylori.  He also had a cholecystectomy performed with pathology consistent with chronic cholecystitis however reports this did not change his symptoms.    He reports he also has had a headache and fatigue.    Reports occasional issues with GERD, takes Tums for this as needed.  Is not on any acid reducers currently.  Reports decreased appetite.    Reports bowels generally move daily to every other day.     His most recent colonoscopy was in 2020 with mild patchy inflammation in the rectosigmoid colon.  No evidence of colitis on recent CT scan.    He has an appointment pending at the [facility] later this month to evaluate his abdominal pain.     Result Review :       Tissue Pathology Exam (07/13/2020 08:01)   COLONOSCOPY (07/13/2020 07:52)   COMPREHENSIVE METABOLIC PANEL (09/13/2022 10:57)   CBC AND DIFFERENTIAL (09/13/2022 10:57)   NM HIDA SCAN WITH PHARMACOLOGICAL INTERVENTION (08/30/2022 10:57)   CT Abdomen Pelvis With Contrast (08/10/2022 12:36)   Reviewed records as summarized in HPI.      Vital Signs:   BP 90/62   Pulse 57   Temp 97 °F (36.1 °C)   Ht 177.8 cm (70\")   Wt 83.1 kg " (183 lb 4.8 oz)   SpO2 97%   BMI 26.30 kg/m²     Body mass index is 26.3 kg/m².     Physical Exam  Vitals reviewed.   Constitutional:       General: He is not in acute distress.     Appearance: He is well-developed.   HENT:      Head: Normocephalic and atraumatic.   Pulmonary:      Effort: Pulmonary effort is normal. No respiratory distress.   Abdominal:      General: Abdomen is flat. A surgical scar is present. Bowel sounds are normal.      Palpations: Abdomen is soft.      Tenderness: There is no abdominal tenderness.   Skin:     General: Skin is dry.      Coloration: Skin is not pale.   Neurological:      Mental Status: He is alert and oriented to person, place, and time.   Psychiatric:         Thought Content: Thought content normal.           Assessment and Plan    Diagnoses and all orders for this visit:    1. Gastritis without bleeding, unspecified chronicity, unspecified gastritis type (Primary)    2. Esophagitis    Other orders  -     pantoprazole (PROTONIX) 40 MG EC tablet; Take 1 tablet by mouth Every Night.  Dispense: 30 tablet; Refill: 2  -     sucralfate (CARAFATE) 1 g tablet; Take 1 tablet by mouth 2 (Two) Times a Day.  Dispense: 60 tablet; Refill: 2         Discussion  Patient presents today for evaluation with concerns about abdominal pain.  He has had rather extensive work-up completed recently.  Discussed with patient today feel abdominal pain is likely secondary to gastritis and recommended initiating treatment with pantoprazole and sucralfate to help with this.  Reviewed dietary modifications to help with this.           Follow Up   Return in about 3 months (around 2/10/2023), or if symptoms worsen or fail to improve.    Patient Instructions   For gastritis, start pantoprazole and sucralfate as prescribed.    Call for any new or worsening symptoms or failure to improve.

## 2022-11-10 NOTE — PATIENT INSTRUCTIONS
For gastritis, start pantoprazole and sucralfate as prescribed.    Call for any new or worsening symptoms or failure to improve.

## 2022-11-15 ENCOUNTER — TELEPHONE (OUTPATIENT)
Dept: GASTROENTEROLOGY | Facility: CLINIC | Age: 82
End: 2022-11-15

## 2022-11-15 NOTE — TELEPHONE ENCOUNTER
Patient is requesting a list of foods that he needs to avoid. Can this be found on the Internet, if so is it ok for him to use that list? Thank you

## 2022-12-05 ENCOUNTER — TELEPHONE (OUTPATIENT)
Dept: GASTROENTEROLOGY | Facility: CLINIC | Age: 82
End: 2022-12-05

## 2022-12-05 NOTE — TELEPHONE ENCOUNTER
Patient is asking how long is he needing to take pantoprazole and sucralfate for   gastropaersis?   Thank you

## 2022-12-05 NOTE — TELEPHONE ENCOUNTER
Pantoprazole and sucralfate are being used for gastritis, I would recommend taking these for 3 months to allow for healing.

## 2023-01-25 RX ORDER — SUCRALFATE 1 G/1
TABLET ORAL
Qty: 60 TABLET | Refills: 2 | Status: SHIPPED | OUTPATIENT
Start: 2023-01-25

## 2023-05-08 RX ORDER — PANTOPRAZOLE SODIUM 40 MG/1
TABLET, DELAYED RELEASE ORAL
Qty: 90 TABLET | Refills: 1 | Status: SHIPPED | OUTPATIENT
Start: 2023-05-08

## 2023-07-12 PROBLEM — K29.70 GASTRITIS WITHOUT BLEEDING: Status: ACTIVE | Noted: 2023-07-12

## 2023-07-24 RX ORDER — SUCRALFATE 1 G/1
TABLET ORAL
Qty: 60 TABLET | Refills: 2 | Status: SHIPPED | OUTPATIENT
Start: 2023-07-24

## 2023-07-25 ENCOUNTER — TRANSCRIBE ORDERS (OUTPATIENT)
Dept: ADMINISTRATIVE | Facility: HOSPITAL | Age: 83
End: 2023-07-25
Payer: MEDICARE

## 2023-07-25 DIAGNOSIS — M51.36 DEGENERATION OF LUMBAR INTERVERTEBRAL DISC: Primary | ICD-10-CM

## 2023-08-07 ENCOUNTER — HOSPITAL ENCOUNTER (OUTPATIENT)
Dept: CARDIOLOGY | Facility: HOSPITAL | Age: 83
Discharge: HOME | End: 2023-08-07
Admitting: INTERNAL MEDICINE
Payer: MEDICARE

## 2023-08-07 VITALS
DIASTOLIC BLOOD PRESSURE: 80 MMHG | SYSTOLIC BLOOD PRESSURE: 131 MMHG | BODY MASS INDEX: 26.2 KG/M2 | WEIGHT: 183 LBS | HEIGHT: 70 IN | HEART RATE: 65 BPM

## 2023-08-07 DIAGNOSIS — I27.20 PULMONARY HYPERTENSION: ICD-10-CM

## 2023-08-07 LAB
ASCENDING AORTA: 3 CM
BH CV ECHO MEAS - ACS: 2.03 CM
BH CV ECHO MEAS - AO MAX PG: 8.6 MMHG
BH CV ECHO MEAS - AO MEAN PG: 4.5 MMHG
BH CV ECHO MEAS - AO ROOT DIAM: 3.7 CM
BH CV ECHO MEAS - AO V2 MAX: 146.6 CM/SEC
BH CV ECHO MEAS - AO V2 VTI: 32.8 CM
BH CV ECHO MEAS - AVA(I,D): 2.44 CM2
BH CV ECHO MEAS - EDV(CUBED): 96.4 ML
BH CV ECHO MEAS - EDV(MOD-SP2): 74 ML
BH CV ECHO MEAS - EDV(MOD-SP4): 92 ML
BH CV ECHO MEAS - EF(MOD-BP): 63.2 %
BH CV ECHO MEAS - EF(MOD-SP2): 58.1 %
BH CV ECHO MEAS - EF(MOD-SP4): 66.3 %
BH CV ECHO MEAS - ESV(CUBED): 28.3 ML
BH CV ECHO MEAS - ESV(MOD-SP2): 31 ML
BH CV ECHO MEAS - ESV(MOD-SP4): 31 ML
BH CV ECHO MEAS - FS: 33.5 %
BH CV ECHO MEAS - IVS/LVPW: 0.87 CM
BH CV ECHO MEAS - IVSD: 0.87 CM
BH CV ECHO MEAS - LAT PEAK E' VEL: 8.2 CM/SEC
BH CV ECHO MEAS - LV DIASTOLIC VOL/BSA (35-75): 45.8 CM2
BH CV ECHO MEAS - LV MASS(C)D: 143.1 GRAMS
BH CV ECHO MEAS - LV MAX PG: 8.5 MMHG
BH CV ECHO MEAS - LV MEAN PG: 3.1 MMHG
BH CV ECHO MEAS - LV SYSTOLIC VOL/BSA (12-30): 15.4 CM2
BH CV ECHO MEAS - LV V1 MAX: 145.7 CM/SEC
BH CV ECHO MEAS - LV V1 VTI: 27.8 CM
BH CV ECHO MEAS - LVIDD: 4.6 CM
BH CV ECHO MEAS - LVIDS: 3 CM
BH CV ECHO MEAS - LVOT AREA: 2.9 CM2
BH CV ECHO MEAS - LVOT DIAM: 1.92 CM
BH CV ECHO MEAS - LVPWD: 0.99 CM
BH CV ECHO MEAS - MED PEAK E' VEL: 10.1 CM/SEC
BH CV ECHO MEAS - MV A DUR: 0.12 SEC
BH CV ECHO MEAS - MV A MAX VEL: 34.3 CM/SEC
BH CV ECHO MEAS - MV DEC SLOPE: 494.7 CM/SEC2
BH CV ECHO MEAS - MV DEC TIME: 0.22 MSEC
BH CV ECHO MEAS - MV E MAX VEL: 93.4 CM/SEC
BH CV ECHO MEAS - MV E/A: 2.7
BH CV ECHO MEAS - MV MAX PG: 4.7 MMHG
BH CV ECHO MEAS - MV MEAN PG: 1.38 MMHG
BH CV ECHO MEAS - MV P1/2T: 57.9 MSEC
BH CV ECHO MEAS - MV V2 VTI: 37.2 CM
BH CV ECHO MEAS - MVA(P1/2T): 3.8 CM2
BH CV ECHO MEAS - MVA(VTI): 2.16 CM2
BH CV ECHO MEAS - PA ACC TIME: 0.12 SEC
BH CV ECHO MEAS - PA V2 MAX: 86.3 CM/SEC
BH CV ECHO MEAS - PULM A REVS DUR: 0.13 SEC
BH CV ECHO MEAS - PULM A REVS VEL: 22.9 CM/SEC
BH CV ECHO MEAS - PULM DIAS VEL: 52.4 CM/SEC
BH CV ECHO MEAS - PULM S/D: 1.08
BH CV ECHO MEAS - PULM SYS VEL: 56.6 CM/SEC
BH CV ECHO MEAS - QP/QS: 0.54
BH CV ECHO MEAS - RAP SYSTOLE: 3 MMHG
BH CV ECHO MEAS - RV MAX PG: 1.61 MMHG
BH CV ECHO MEAS - RV V1 MAX: 63.4 CM/SEC
BH CV ECHO MEAS - RV V1 VTI: 17.1 CM
BH CV ECHO MEAS - RVOT DIAM: 1.79 CM
BH CV ECHO MEAS - RVSP: 33 MMHG
BH CV ECHO MEAS - SI(MOD-SP2): 21.4 ML/M2
BH CV ECHO MEAS - SI(MOD-SP4): 30.3 ML/M2
BH CV ECHO MEAS - SV(LVOT): 80.2 ML
BH CV ECHO MEAS - SV(MOD-SP2): 43 ML
BH CV ECHO MEAS - SV(MOD-SP4): 61 ML
BH CV ECHO MEAS - SV(RVOT): 43.2 ML
BH CV ECHO MEAS - TAPSE (>1.6): 2.14 CM
BH CV ECHO MEAS - TR MAX PG: 30.3 MMHG
BH CV ECHO MEAS - TR MAX VEL: 275.1 CM/SEC
BH CV ECHO MEASUREMENTS AVERAGE E/E' RATIO: 10.21
BH CV XLRA - RV BASE: 3.4 CM
BH CV XLRA - RV LENGTH: 6.8 CM
BH CV XLRA - RV MID: 3 CM
BH CV XLRA - TDI S': 15.4 CM/SEC
LEFT ATRIUM VOLUME INDEX: 22.1 ML/M2
SINUS: 3.1 CM
STJ: 2.9 CM

## 2023-08-07 PROCEDURE — 93306 TTE W/DOPPLER COMPLETE: CPT

## 2023-08-07 PROCEDURE — 93306 TTE W/DOPPLER COMPLETE: CPT | Performed by: INTERNAL MEDICINE

## 2023-08-07 PROCEDURE — 25510000001 PERFLUTREN (DEFINITY) 8.476 MG IN SODIUM CHLORIDE (PF) 0.9 % 10 ML INJECTION: Performed by: INTERNAL MEDICINE

## 2023-08-07 RX ADMIN — SODIUM CHLORIDE 2 ML: 9 INJECTION INTRAMUSCULAR; INTRAVENOUS; SUBCUTANEOUS at 10:15

## 2023-08-23 ENCOUNTER — HOSPITAL ENCOUNTER (OUTPATIENT)
Facility: SURGERY CENTER | Age: 83
Setting detail: HOSPITAL OUTPATIENT SURGERY
Discharge: HOME | End: 2023-08-23
Attending: INTERNAL MEDICINE | Admitting: INTERNAL MEDICINE
Payer: MEDICARE

## 2023-08-23 ENCOUNTER — ANESTHESIA (OUTPATIENT)
Dept: SURGERY | Facility: SURGERY CENTER | Age: 83
End: 2023-08-23
Payer: MEDICARE

## 2023-08-23 ENCOUNTER — ANESTHESIA EVENT (OUTPATIENT)
Dept: SURGERY | Facility: SURGERY CENTER | Age: 83
End: 2023-08-23
Payer: MEDICARE

## 2023-08-23 VITALS
BODY MASS INDEX: 26.63 KG/M2 | HEART RATE: 58 BPM | WEIGHT: 185.6 LBS | SYSTOLIC BLOOD PRESSURE: 134 MMHG | OXYGEN SATURATION: 98 % | DIASTOLIC BLOOD PRESSURE: 82 MMHG | RESPIRATION RATE: 16 BRPM | TEMPERATURE: 98.2 F

## 2023-08-23 DIAGNOSIS — K29.70 GASTRITIS WITHOUT BLEEDING, UNSPECIFIED CHRONICITY, UNSPECIFIED GASTRITIS TYPE: ICD-10-CM

## 2023-08-23 DIAGNOSIS — K20.90 ESOPHAGITIS: ICD-10-CM

## 2023-08-23 DIAGNOSIS — R10.10 PAIN OF UPPER ABDOMEN: ICD-10-CM

## 2023-08-23 PROCEDURE — 88305 TISSUE EXAM BY PATHOLOGIST: CPT | Performed by: INTERNAL MEDICINE

## 2023-08-23 PROCEDURE — 43239 EGD BIOPSY SINGLE/MULTIPLE: CPT | Performed by: INTERNAL MEDICINE

## 2023-08-23 PROCEDURE — 25010000002 PROPOFOL 10 MG/ML EMULSION: Performed by: ANESTHESIOLOGY

## 2023-08-23 PROCEDURE — 0 LIDOCAINE 1 % SOLUTION: Performed by: ANESTHESIOLOGY

## 2023-08-23 PROCEDURE — 87081 CULTURE SCREEN ONLY: CPT | Performed by: INTERNAL MEDICINE

## 2023-08-23 RX ORDER — FLUTICASONE PROPIONATE 50 MCG
SPRAY, SUSPENSION (ML) NASAL
COMMUNITY

## 2023-08-23 RX ORDER — LIDOCAINE HYDROCHLORIDE 10 MG/ML
INJECTION, SOLUTION INFILTRATION; PERINEURAL AS NEEDED
Status: DISCONTINUED | OUTPATIENT
Start: 2023-08-23 | End: 2023-08-23 | Stop reason: SURG

## 2023-08-23 RX ORDER — SODIUM CHLORIDE 0.9 % (FLUSH) 0.9 %
3 SYRINGE (ML) INJECTION EVERY 12 HOURS SCHEDULED
Status: DISCONTINUED | OUTPATIENT
Start: 2023-08-23 | End: 2023-08-23 | Stop reason: HOSPADM

## 2023-08-23 RX ORDER — MAGNESIUM HYDROXIDE 1200 MG/15ML
LIQUID ORAL AS NEEDED
Status: DISCONTINUED | OUTPATIENT
Start: 2023-08-23 | End: 2023-08-23 | Stop reason: HOSPADM

## 2023-08-23 RX ORDER — ASPIRIN 81 MG/1
TABLET ORAL
COMMUNITY

## 2023-08-23 RX ORDER — PROPOFOL 10 MG/ML
VIAL (ML) INTRAVENOUS AS NEEDED
Status: DISCONTINUED | OUTPATIENT
Start: 2023-08-23 | End: 2023-08-23 | Stop reason: SURG

## 2023-08-23 RX ORDER — OMEPRAZOLE 40 MG/1
40 CAPSULE, DELAYED RELEASE ORAL DAILY
Qty: 30 CAPSULE | Refills: 5 | Status: SHIPPED | OUTPATIENT
Start: 2023-08-23

## 2023-08-23 RX ORDER — SODIUM CHLORIDE 0.9 % (FLUSH) 0.9 %
10 SYRINGE (ML) INJECTION AS NEEDED
Status: DISCONTINUED | OUTPATIENT
Start: 2023-08-23 | End: 2023-08-23 | Stop reason: HOSPADM

## 2023-08-23 RX ORDER — SODIUM CHLORIDE, SODIUM LACTATE, POTASSIUM CHLORIDE, CALCIUM CHLORIDE 600; 310; 30; 20 MG/100ML; MG/100ML; MG/100ML; MG/100ML
30 INJECTION, SOLUTION INTRAVENOUS CONTINUOUS PRN
Status: DISCONTINUED | OUTPATIENT
Start: 2023-08-23 | End: 2023-08-23 | Stop reason: HOSPADM

## 2023-08-23 RX ADMIN — PROPOFOL 30 MG: 10 INJECTION, EMULSION INTRAVENOUS at 11:44

## 2023-08-23 RX ADMIN — SODIUM CHLORIDE, POTASSIUM CHLORIDE, SODIUM LACTATE AND CALCIUM CHLORIDE 30 ML/HR: 600; 310; 30; 20 INJECTION, SOLUTION INTRAVENOUS at 10:32

## 2023-08-23 RX ADMIN — PROPOFOL 70 MG: 10 INJECTION, EMULSION INTRAVENOUS at 11:42

## 2023-08-23 RX ADMIN — PROPOFOL 20 MG: 10 INJECTION, EMULSION INTRAVENOUS at 11:46

## 2023-08-23 RX ADMIN — LIDOCAINE HYDROCHLORIDE 30 MG: 10 INJECTION, SOLUTION INFILTRATION; PERINEURAL at 11:42

## 2023-08-23 NOTE — ANESTHESIA PREPROCEDURE EVALUATION
Anesthesia Evaluation     Patient summary reviewed and Nursing notes reviewed   NPO Solid Status: > 8 hours  NPO Liquid Status: > 2 hours           Airway   Mallampati: II  TM distance: >3 FB  Neck ROM: full  No difficulty expected  Dental - normal exam     Pulmonary - negative pulmonary ROS and normal exam   Cardiovascular - negative cardio ROS and normal exam  Exercise tolerance: good (4-7 METS)    (+) hypertension, CAD, hyperlipidemia      Neuro/Psych- negative ROS  (+) Parkinson's disease, psychiatric history Anxiety and Depression  GI/Hepatic/Renal/Endo - negative ROS   (+) thyroid problem hypothyroidism    Musculoskeletal (-) negative ROS    Abdominal    Substance History - negative use     OB/GYN          Other   arthritis,   history of cancer                  Anesthesia Plan    ASA 3     MAC     intravenous induction     Anesthetic plan, risks, benefits, and alternatives have been provided, discussed and informed consent has been obtained with: patient.    CODE STATUS:

## 2023-08-23 NOTE — H&P
No chief complaint on file.      HPI  Epigastric pain  Gerd           Problem List:    Patient Active Problem List   Diagnosis    Epigastric pain    Diarrhea    Colitis    Abnormal CT scan, sigmoid colon    Spondylosis without myelopathy    Early satiety    Proteins serum plasma low    Abnormal magnetic resonance imaging of head    Anxiety    Benign essential hypertension    Benign neoplasm of colon    Bilateral high frequency sensorineural hearing loss    Blood glucose abnormal    Blood in urine    Chest pain    Contusion of chest    Coronary artery disease involving native coronary artery of native heart without angina pectoris    COVID-19    Depressive disorder    Diverticular disease of colon    Dyslipidemia    ED (erectile dysfunction) of organic origin    Elevated LFTs    Extremity pain    Fatigue    MONTSERRAT (generalized anxiety disorder)    Gastroesophageal reflux disease    Hammer toe    History of skin cancer    Hypogonadism    Hypothyroidism    Immunodeficiency disorder    Impacted cerumen    Injury of foot    Kidney cysts    Laceration    Left lower lobe pneumonia    Low blood pressure    Low testosterone in male    Pain in the coccyx    Malignant neoplasm of skin    Mitral valve disorder    Mixed hyperlipidemia    Myositis    Non-seasonal allergic rhinitis    Olecranon bursitis of left elbow    Pain of left thumb    Prediabetes    Primary malignant neoplasm of prostate    Pulmonary hypertension    Renal failure syndrome    Renal function test abnormal    Seasonal allergies    Secondary insomnia    Testicular hypofunction    Tinnitus of both ears    Urinary tract infectious disease    Wound of skin    Chronic constipation    Chronic esophagitis    Irritable bowel syndrome    Parkinson's disease    Gastritis without bleeding       Medical History:    Past Medical History:   Diagnosis Date    Anxiety     Arthritis     Back pain     Depression     Disease of thyroid gland     Environmental allergies      Hyperlipidemia     Hypertension     Prostate cancer         Social History:    Social History     Socioeconomic History    Marital status:    Tobacco Use    Smoking status: Former     Packs/day: 1.00     Years: 10.00     Pack years: 10.00     Types: Cigarettes     Start date: 1958     Quit date: 1970     Years since quittin.6    Smokeless tobacco: Never   Substance and Sexual Activity    Alcohol use: Yes     Comment: 2 drinks daily    Drug use: No    Sexual activity: Not Currently     Partners: Female       Family History:   Family History   Problem Relation Age of Onset    No Known Problems Mother     Arthritis Father     Cancer Father     Heart disease Father     Parkinsonism Father     No Known Problems Maternal Grandmother     No Known Problems Maternal Grandfather     No Known Problems Paternal Grandmother     No Known Problems Paternal Grandfather     Colon cancer Neg Hx     Colon polyps Neg Hx     Irritable bowel syndrome Neg Hx     Ulcerative colitis Neg Hx        Surgical History:   Past Surgical History:   Procedure Laterality Date    CHOLECYSTECTOMY  9/15/2022    COLONOSCOPY N/A 2020    Procedure: COLONOSCOPY TO CECUM with bx;  Surgeon: Dinesh Starr MD;  [facility];  Service: General;  Laterality: N/A;  PREOP/ melena, diarrhea  POSTOP/ diverticulosis, non specific colitis, proctocolitis    ENDOSCOPY N/A 2020    Procedure: ESOPHAGOGASTRODUODENOSCOPY WITH BIOPSY;  Surgeon: Dinesh Starr MD;  Location:  [facility];  Service: General;  Laterality: N/A;  PREOP/ abdominal pain  POSTOP/ gastritis, duodenitis, esophagitis    PROSTATECTOMY      TONSILLECTOMY      UPPER GASTROINTESTINAL ENDOSCOPY  9/15/2022       No current facility-administered medications for this encounter.    Current Outpatient Medications:     ALPRAZolam (XANAX) 0.5 MG tablet, alprazolam 0.5 mg tablet  TAKE 1 TABLET EVERY DAY FOR ANXIETY ATTACK, Disp: , Rfl:     ammonium lactate  (LAC-HYDRIN) 12 % lotion, , Disp: , Rfl:     carvedilol (COREG) 12.5 MG tablet, Take 1 tablet by mouth 2 (Two) Times a Day., Disp: , Rfl:     carvedilol (COREG) 6.25 MG tablet, Take 6.25 mg by mouth 2 (Two) Times a Day., Disp: , Rfl: 2    citalopram (CeleXA) 20 MG tablet, Take  by mouth., Disp: , Rfl:     citalopram (CeleXA) 20 MG tablet, 1 tablet., Disp: , Rfl:     ezetimibe (ZETIA) 10 MG tablet, Take 10 mg by mouth daily., Disp: , Rfl:     ezetimibe (ZETIA) 10 MG tablet, 1 tablet., Disp: , Rfl:     levocetirizine (XYZAL) 5 MG tablet, Take 5 mg by mouth every evening., Disp: , Rfl:     levocetirizine (XYZAL) 5 MG tablet, 1 tablet., Disp: , Rfl:     levothyroxine (SYNTHROID, LEVOTHROID) 50 MCG tablet, Take  by mouth., Disp: , Rfl:     lisinopril (PRINIVIL,ZESTRIL) 20 MG tablet, Take 20 mg by mouth 2 (Two) Times a Day., Disp: , Rfl:     pantoprazole (PROTONIX) 40 MG EC tablet, TAKE 1 TABLET BY MOUTH EVERY DAY AT NIGHT, Disp: 90 tablet, Rfl: 1    rosuvastatin (CRESTOR) 40 MG tablet, Take 40 mg by mouth Daily., Disp: , Rfl:     sucralfate (CARAFATE) 1 g tablet, TAKE 1 TABLET BY MOUTH TWICE A DAY, Disp: 60 tablet, Rfl: 2    Testosterone 20.25 MG/ACT (1.62%) gel, APPLY 2 PUMPS TO SKIN DAILY, Disp: , Rfl:     Testosterone Cypionate 200 MG/ML solution, testosterone cypionate 200 mg/mL intramuscular oil, Disp: , Rfl:     traMADol (ULTRAM) 50 MG tablet, tramadol 50 mg tablet  Take 2 tablets 3 times a day by oral route., Disp: , Rfl:     traMADol (ULTRAM) 50 MG tablet, 1 tablet., Disp: , Rfl:     Allergies:   Allergies   Allergen Reactions    Dust Mite Extract     Grass     Grass Pollen(K-O-R-T-Swt Akira) Unknown - Low Severity    Molds & Smuts Other (See Comments)        The following portions of the patient's history were reviewed by me and updated as appropriate: review of systems, allergies, current medications, past family history, past medical history, past social history, past surgical history and problem list.    There  were no vitals filed for this visit.    PHYSICAL EXAM:    CONSTITUTIONAL:  today's vital signs reviewed by me  GASTROINTESTINAL: abdomen is soft nontender nondistended with normal active bowel sounds, no masses are appreciated    Assessment/ Plan  Epigastric pain   Gerd    egd    Risks and benefits as well as alternatives to endoscopic evaluation were explained to the patient and they voiced understanding and wish to proceed.  These risks include but are not limited to the risk of bleeding, perforation, adverse reaction to sedation, and missed lesions.  The patient was given the opportunity to ask questions prior to the endoscopic procedure.

## 2023-08-23 NOTE — ANESTHESIA POSTPROCEDURE EVALUATION
Patient: Willy Saenz    Procedure Summary       Date: 08/23/23 Room / Location: [facility] / [facility]    Anesthesia Start: 1136 Anesthesia Stop: 1158    Procedure: ESOPHAGOGASTRODUODENOSCOPY with biopsy Diagnosis:       Gastritis without bleeding, unspecified chronicity, unspecified gastritis type      Esophagitis      Pain of upper abdomen      (Gastritis without bleeding, unspecified chronicity, unspecified gastritis type [K29.70])      (Esophagitis [K20.90])      (Pain of upper abdomen [R10.10])    Surgeons: Kyle Rodriguez MD Provider: Indio Montgomery MD    Anesthesia Type: MAC ASA Status: 3            Anesthesia Type: MAC    Vitals  Vitals Value Taken Time   /80 08/23/23 1154   Temp 36.8 øC (98.2 øF) 08/23/23 1154   Pulse 58 08/23/23 1154   Resp 16 08/23/23 1154   SpO2 93 % 08/23/23 1154           Post Anesthesia Care and Evaluation    Patient location during evaluation: bedside  Patient participation: complete - patient participated  Level of consciousness: awake and alert  Pain management: adequate    Airway patency: patent  Anesthetic complications: No anesthetic complications  PONV Status: controlled  Cardiovascular status: acceptable  Respiratory status: acceptable  Hydration status: acceptable    Comments: /80 (BP Location: Left arm, Patient Position: Lying)   Pulse 58   Temp 36.8 øC (98.2 øF) (Temporal)   Resp 16   Wt 84.2 kg (185 lb 9.6 oz)   SpO2 93%   BMI 26.63 kg/mý

## 2023-08-24 LAB
LAB AP CASE REPORT: NORMAL
LAB AP CLINICAL INFORMATION: NORMAL
PATH REPORT.FINAL DX SPEC: NORMAL
PATH REPORT.GROSS SPEC: NORMAL

## 2023-08-25 ENCOUNTER — HOSPITAL ENCOUNTER (OUTPATIENT)
Dept: MRI IMAGING | Facility: HOSPITAL | Age: 83
Discharge: HOME | End: 2023-08-25
Admitting: INTERNAL MEDICINE
Payer: MEDICARE

## 2023-08-25 DIAGNOSIS — M51.36 DEGENERATION OF LUMBAR INTERVERTEBRAL DISC: ICD-10-CM

## 2023-08-25 LAB — UREASE TISS QL: NEGATIVE

## 2023-08-25 PROCEDURE — 72148 MRI LUMBAR SPINE W/O DYE: CPT

## 2023-09-06 ENCOUNTER — TELEPHONE (OUTPATIENT)
Dept: GASTROENTEROLOGY | Facility: CLINIC | Age: 83
End: 2023-09-06
Payer: MEDICARE

## 2023-09-14 ENCOUNTER — OFFICE VISIT (OUTPATIENT)
Dept: GASTROENTEROLOGY | Facility: CLINIC | Age: 83
End: 2023-09-14
Payer: MEDICARE

## 2023-09-14 VITALS
BODY MASS INDEX: 27.27 KG/M2 | DIASTOLIC BLOOD PRESSURE: 70 MMHG | HEART RATE: 58 BPM | OXYGEN SATURATION: 99 % | HEIGHT: 70 IN | SYSTOLIC BLOOD PRESSURE: 110 MMHG | WEIGHT: 190.5 LBS | TEMPERATURE: 96.4 F

## 2023-09-14 DIAGNOSIS — R10.13 EPIGASTRIC PAIN: ICD-10-CM

## 2023-09-14 DIAGNOSIS — K58.9 IRRITABLE BOWEL SYNDROME, UNSPECIFIED TYPE: ICD-10-CM

## 2023-09-14 DIAGNOSIS — R93.3 ABNORMAL CT SCAN, SIGMOID COLON: Primary | ICD-10-CM

## 2023-09-14 DIAGNOSIS — K21.9 GASTROESOPHAGEAL REFLUX DISEASE WITHOUT ESOPHAGITIS: ICD-10-CM

## 2023-09-14 DIAGNOSIS — K52.9 COLITIS: ICD-10-CM

## 2023-09-14 DIAGNOSIS — K20.90 CHRONIC ESOPHAGITIS: ICD-10-CM

## 2023-09-14 DIAGNOSIS — K57.30 DIVERTICULAR DISEASE OF COLON: ICD-10-CM

## 2023-09-14 DIAGNOSIS — K29.00 ACUTE SUPERFICIAL GASTRITIS WITHOUT HEMORRHAGE: ICD-10-CM

## 2023-09-14 PROCEDURE — 1160F RVW MEDS BY RX/DR IN RCRD: CPT | Performed by: INTERNAL MEDICINE

## 2023-09-14 PROCEDURE — 99214 OFFICE O/P EST MOD 30 MIN: CPT | Performed by: INTERNAL MEDICINE

## 2023-09-14 PROCEDURE — 3078F DIAST BP <80 MM HG: CPT | Performed by: INTERNAL MEDICINE

## 2023-09-14 PROCEDURE — 1159F MED LIST DOCD IN RCRD: CPT | Performed by: INTERNAL MEDICINE

## 2023-09-14 PROCEDURE — 3074F SYST BP LT 130 MM HG: CPT | Performed by: INTERNAL MEDICINE

## 2023-09-14 RX ORDER — MEMANTINE HYDROCHLORIDE 5 MG/1
5 TABLET ORAL
COMMUNITY

## 2023-09-14 NOTE — PATIENT INSTRUCTIONS
1.  Please continue your omeprazole once a day  2.  Please use your Carafate 1-2 times per day and your FDgard 1-2 times per day as needed  3.  If your symptoms worsen or you decide you would like to do it we will do the further testing we discussed with the capsule study and the bacterial overgrowth breath testing.  Currently we are not planning this  4.  Please follow-up with your primary care as you mentioned twice yearly with blood work to look at your kidney function  5.  We will call you once our template is open for 2024 and get you an office visit routinely in 1 year but please call us if you need to be seen for symptoms earlier

## 2023-09-14 NOTE — PROGRESS NOTES
"No chief complaint on file.  Fu egd        History of Present Illness  83-year-old gentleman here for follow-up evaluation.  Patient reports that since his last scope and we have change his medicine he is doing very well he was at a 5-6 out of 10 and feeling well and is now up to a 8-9 out of 10 feeling well.  He is using his sucralfate 1-2 times per day and the FD guard 1-2 times per day.  We reviewed his other medications.  Overall he states he can live like this.  He is living a good quality of life with this as well.  He did have several questions about the medications which we reviewed today      UPPER GI ENDOSCOPY (08/23/2023 11:32) barretts gastritis tried FD jose  Urease For H Pylori - Tissue, Gastric, Antrum (08/23/2023 11:48)   Tissue Pathology Exam (08/23/2023 11:47)     UPPER GI ENDOSCOPY (07/13/2020 07:53) okoon esophagitis and gastritis  COLONOSCOPY (07/13/2020 07:52) patchy rectosigmoid coltitis  Tissue Pathology Exam (07/13/2020 08:01)   ? Lymphocytic colits drug reaction  Previous workup:   He has had extensive work-up completed recently to evaluate the pain including CT scan which showed hepatic hemangioma and diverticulosis, otherwise unremarkable from a GI standpoint. HIDA scan which was normal with ejection fraction of 94%. Gastric emptying study which was negative. He underwent EGD which showed a hiatal hernia with esophagitis and gastritis with biopsies negative for H. pylori. He also had a cholecystectomy performed with pathology consistent with chronic cholecystitis however reports this did not change his symptoms.   [facility] workup not done?    omeprazole (priLOSEC) 40 MG capsule (08/23/2023)   sucralfate (CARAFATE) 1 g tablet (07/24/2023)   FD jose  Review of Systems     Result Review :           Vital Signs:   /70   Pulse 58   Temp 96.4 °F (35.8 °C)   Ht 177.8 cm (70\")   Wt 86.4 kg (190 lb 8 oz)   SpO2 99%   BMI 27.33 kg/m²     Body mass index is 27.33 kg/m².   "   Physical Exam      Assessment and Plan    Diagnoses and all orders for this visit:    1. Abnormal CT scan, sigmoid colon (Primary)    2. Chronic esophagitis    3. Colitis    4. Diverticular disease of colon    5. Epigastric pain    6. Acute superficial gastritis without hemorrhage    7. Gastroesophageal reflux disease without esophagitis    8. Irritable bowel syndrome, unspecified type             I have reviewed and confirmed the accuracy of the HPI and Assessment and Plan as documented by the APRN Kyle Rodriguez MD        Follow Up   Return in about 1 year (around 9/14/2024).    Patient Instructions   1.  Please continue your omeprazole once a day  2.  Please use your Carafate 1-2 times per day and your FDgard 1-2 times per day as needed  3.  If your symptoms worsen or you decide you would like to do it we will do the further testing we discussed with the capsule study and the bacterial overgrowth breath testing.  Currently we are not planning this  4.  Please follow-up with your primary care as you mentioned twice yearly with blood work to look at your kidney function  5.  We will call you once our template is open for 2024 and get you an office visit routinely in 1 year but please call us if you need to be seen for symptoms earlier

## 2023-09-18 RX ORDER — SUCRALFATE 1 G/1
TABLET ORAL
Qty: 60 TABLET | Refills: 2 | Status: SHIPPED | OUTPATIENT
Start: 2023-09-18

## 2023-10-06 RX ORDER — SUCRALFATE 1 G/1
TABLET ORAL
Qty: 180 TABLET | Refills: 0 | Status: SHIPPED | OUTPATIENT
Start: 2023-10-06

## 2023-12-15 NOTE — PROGRESS NOTES
Subjective   Patient ID: Willy Saenz is a 83 y.o. male is being seen for consultation today at the request of Willy Almeida,*    I saw this patient about 9 years ago for his back.  At that time I did not think he needed any surgery and told him to continue working with his pain physician .  He has done that and by enlarge intermittent transforaminal epidural blocks and twice yearly radiofrequency ablations have kept his back pain under control.  He had an additional flareup earlier this year and a new MRI was done which I reviewed.  The results are summarized below.  There is some progression of his overall facet disease and disc degeneration but no high-grade stenosis.  He really has not had any sciatica and most of his pain is in the lumbar sacral junction.  He does have some pain in the SI joints.  I did mention to him that he might want to speak with Dr. Donato trying SI injections as that might tide him over between epidural blocks and radiofrequency ablations.  But he remains functional.  He continues to golf.  He spends half the year in [facility].  He uses a brace when he golfs.  I told him he might want to get a new one since it is quite worn and use it when he is walking or up and about.  I reassured him that he still does not need any surgery.  I told him that he might want to consider looking into things like yoga or Pilates that might help strengthen his core.  He has no motor deficits.    History of Present Illness    The following portions of the patient's history were reviewed and updated as appropriate: allergies, current medications, past family history, past medical history, past social history, past surgical history, and problem list.    Review of Systems   Constitutional:  Negative for fever.   Musculoskeletal:  Positive for back pain.   Neurological:  Negative for weakness and numbness.   All other systems reviewed and are negative.          Objective     Vitals:     "12/18/23 1123   BP: 122/76   BP Location: Left arm   Patient Position: Sitting   Cuff Size: Adult   Resp: 20   Weight: 86.2 kg (190 lb)   Height: 177.8 cm (70\")   PainSc:   5   PainLoc: Back     Body mass index is 27.26 kg/m².    Tobacco Use: Medium Risk (12/18/2023)    Patient History     Smoking Tobacco Use: Former     Smokeless Tobacco Use: Never     Passive Exposure: Not on file          Physical Exam  Constitutional:       Appearance: He is well-developed.   HENT:      Head: Normocephalic and atraumatic.   Eyes:      Extraocular Movements: EOM normal.      Conjunctiva/sclera: Conjunctivae normal.      Pupils: Pupils are equal, round, and reactive to light.   Neck:      Vascular: No carotid bruit.   Neurological:      Mental Status: He is oriented to person, place, and time.      Coordination: Finger-Nose-Finger Test and Heel to Shin Test normal.      Gait: Gait is intact.      Deep Tendon Reflexes:      Reflex Scores:       Tricep reflexes are 2+ on the right side and 2+ on the left side.       Bicep reflexes are 2+ on the right side and 2+ on the left side.       Brachioradialis reflexes are 2+ on the right side and 2+ on the left side.       Patellar reflexes are 2+ on the right side and 2+ on the left side.       Achilles reflexes are 2+ on the right side and 2+ on the left side.  Psychiatric:         Speech: Speech normal.       Neurologic Exam     Mental Status   Oriented to person, place, and time.   Registration of memory: Good recent and remote memory.   Attention: normal. Concentration: normal.   Speech: speech is normal   Level of consciousness: alert  Knowledge: consistent with education.     Cranial Nerves     CN II   Visual fields full to confrontation.   Visual acuity: normal    CN III, IV, VI   Pupils are equal, round, and reactive to light.  Extraocular motions are normal.     CN V   Facial sensation intact.   Right corneal reflex: normal  Left corneal reflex: normal    CN VII   Facial " expression full, symmetric.   Right facial weakness: none  Left facial weakness: none    CN VIII   Hearing: intact    CN IX, X   Palate: symmetric    CN XI   Right sternocleidomastoid strength: normal  Left sternocleidomastoid strength: normal    CN XII   Tongue: not atrophic  Tongue deviation: none    Motor Exam   Muscle bulk: normal  Right arm tone: normal  Left arm tone: normal  Right leg tone: normal  Left leg tone: normal    Strength   Strength 5/5 except as noted.     Sensory Exam   Light touch normal.     Gait, Coordination, and Reflexes     Gait  Gait: normal    Coordination   Finger to nose coordination: normal  Heel to shin coordination: normal    Reflexes   Right brachioradialis: 2+  Left brachioradialis: 2+  Right biceps: 2+  Left biceps: 2+  Right triceps: 2+  Left triceps: 2+  Right patellar: 2+  Left patellar: 2+  Right achilles: 2+  Left achilles: 2+  Right : 2+  Left : 2+          Assessment & Plan   Independent Review of Radiographic Studies:      I personally reviewed the images from the following studies.    The MRI done on 8/25/2023 shows some progression of his facet disease, disc degeneration, and progression to mild to moderate spinal stenosis.  Agree with the report.        Medical Decision Making:      He will continue working with Dr. Donato.  I told him to speak to him about possibly including SI joint injections and possibly an SI joint ablation as part of the armamentarium that he is using.  He will get a new brace from [facility].  He will continue his exercises and look into Pilates and yoga.  Because it took a long time for him to see me, I will schedule a follow-up visit in 6 months when he gets back from [place].  If he develops sciatica, he will let us know.        Diagnoses and all orders for this visit:    1. DDD (degenerative disc disease), lumbar (Primary)    2. Spinal stenosis of lumbar region without neurogenic claudication    3. Chronic sacroiliac pain      Return  in about 6 months (around 6/18/2024) for face to face.

## 2023-12-18 ENCOUNTER — OFFICE VISIT (OUTPATIENT)
Dept: NEUROSURGERY | Facility: CLINIC | Age: 83
End: 2023-12-18
Payer: MEDICARE

## 2023-12-18 VITALS
WEIGHT: 190 LBS | RESPIRATION RATE: 20 BRPM | HEIGHT: 70 IN | DIASTOLIC BLOOD PRESSURE: 76 MMHG | BODY MASS INDEX: 27.2 KG/M2 | SYSTOLIC BLOOD PRESSURE: 122 MMHG

## 2023-12-18 DIAGNOSIS — M51.36 DDD (DEGENERATIVE DISC DISEASE), LUMBAR: Primary | ICD-10-CM

## 2023-12-18 DIAGNOSIS — M53.3 CHRONIC SACROILIAC PAIN: ICD-10-CM

## 2023-12-18 DIAGNOSIS — M48.061 SPINAL STENOSIS OF LUMBAR REGION WITHOUT NEUROGENIC CLAUDICATION: ICD-10-CM

## 2023-12-18 DIAGNOSIS — G89.29 CHRONIC SACROILIAC PAIN: ICD-10-CM

## 2023-12-18 PROBLEM — M51.369 DDD (DEGENERATIVE DISC DISEASE), LUMBAR: Status: ACTIVE | Noted: 2023-12-18

## 2023-12-18 PROCEDURE — 1160F RVW MEDS BY RX/DR IN RCRD: CPT | Performed by: NEUROLOGICAL SURGERY

## 2023-12-18 PROCEDURE — 1159F MED LIST DOCD IN RCRD: CPT | Performed by: NEUROLOGICAL SURGERY

## 2023-12-18 PROCEDURE — 3078F DIAST BP <80 MM HG: CPT | Performed by: NEUROLOGICAL SURGERY

## 2023-12-18 PROCEDURE — 3074F SYST BP LT 130 MM HG: CPT | Performed by: NEUROLOGICAL SURGERY

## 2023-12-18 PROCEDURE — 99204 OFFICE O/P NEW MOD 45 MIN: CPT | Performed by: NEUROLOGICAL SURGERY

## 2024-01-15 ENCOUNTER — APPOINTMENT (RX ONLY)
Dept: URBAN - METROPOLITAN AREA CLINIC 124 | Facility: CLINIC | Age: 84
Setting detail: DERMATOLOGY
End: 2024-01-15

## 2024-01-15 DIAGNOSIS — L82.0 INFLAMED SEBORRHEIC KERATOSIS: ICD-10-CM

## 2024-01-15 DIAGNOSIS — L73.8 OTHER SPECIFIED FOLLICULAR DISORDERS: ICD-10-CM

## 2024-01-15 DIAGNOSIS — L72.0 EPIDERMAL CYST: ICD-10-CM

## 2024-01-15 PROCEDURE — ? COUNSELING

## 2024-01-15 PROCEDURE — ? LIQUID NITROGEN

## 2024-01-15 PROCEDURE — 17110 DESTRUCTION B9 LES UP TO 14: CPT

## 2024-01-15 PROCEDURE — 99202 OFFICE O/P NEW SF 15 MIN: CPT | Mod: 25

## 2024-01-15 ASSESSMENT — LOCATION ZONE DERM
LOCATION ZONE: SCALP
LOCATION ZONE: FACE

## 2024-01-15 ASSESSMENT — LOCATION SIMPLE DESCRIPTION DERM
LOCATION SIMPLE: SCALP
LOCATION SIMPLE: LEFT CHEEK

## 2024-01-15 ASSESSMENT — LOCATION DETAILED DESCRIPTION DERM
LOCATION DETAILED: LEFT CENTRAL FRONTAL SCALP
LOCATION DETAILED: LEFT SUPERIOR MEDIAL BUCCAL CHEEK
LOCATION DETAILED: LEFT CENTRAL MALAR CHEEK

## 2024-02-09 RX ORDER — SUCRALFATE 1 G/1
TABLET ORAL
Qty: 60 TABLET | Refills: 2 | Status: SHIPPED | OUTPATIENT
Start: 2024-02-09

## 2024-02-12 RX ORDER — OMEPRAZOLE 40 MG/1
40 CAPSULE, DELAYED RELEASE ORAL DAILY
Qty: 90 CAPSULE | Refills: 1 | Status: SHIPPED | OUTPATIENT
Start: 2024-02-12

## 2024-04-24 ENCOUNTER — APPOINTMENT (RX ONLY)
Dept: URBAN - METROPOLITAN AREA CLINIC 124 | Facility: CLINIC | Age: 84
Setting detail: DERMATOLOGY
End: 2024-04-24

## 2024-04-24 DIAGNOSIS — B07.8 OTHER VIRAL WARTS: ICD-10-CM

## 2024-04-24 DIAGNOSIS — L82.1 OTHER SEBORRHEIC KERATOSIS: ICD-10-CM

## 2024-04-24 PROCEDURE — ? COUNSELING

## 2024-04-24 PROCEDURE — ? LIQUID NITROGEN

## 2024-04-24 PROCEDURE — 17110 DESTRUCTION B9 LES UP TO 14: CPT

## 2024-04-24 PROCEDURE — 99212 OFFICE O/P EST SF 10 MIN: CPT | Mod: 25

## 2024-04-24 ASSESSMENT — LOCATION ZONE DERM
LOCATION ZONE: SCALP
LOCATION ZONE: FACE

## 2024-04-24 ASSESSMENT — LOCATION DETAILED DESCRIPTION DERM
LOCATION DETAILED: LEFT CENTRAL FRONTAL SCALP
LOCATION DETAILED: LEFT CHIN

## 2024-04-24 ASSESSMENT — LOCATION SIMPLE DESCRIPTION DERM
LOCATION SIMPLE: SCALP
LOCATION SIMPLE: CHIN

## 2024-04-24 NOTE — PROCEDURE: LIQUID NITROGEN
Medical Necessity Clause: This procedure was medically necessary because the lesions that were treated were:
Consent: The patient's consent was obtained including but not limited to risks of crusting, scabbing, blistering, scarring, darker or lighter pigmentary change, recurrence, incomplete removal and infection.
Detail Level: Detailed
Show Aperture Variable?: Yes
Medical Necessity Information: It is in your best interest to select a reason for this procedure from the list below. All of these items fulfill various CMS LCD requirements except the new and changing color options.
Post-Care Instructions: I reviewed with the patient in detail post-care instructions. Patient is to wear sunprotection, and avoid picking at any of the treated lesions. Pt may apply Vaseline to crusted or scabbing areas.
Render Post-Care Instructions In Note?: no
Spray Paint Text: The liquid nitrogen was applied to the skin utilizing a spray paint frosting technique.

## 2024-06-06 ENCOUNTER — TELEPHONE (OUTPATIENT)
Dept: NEUROSURGERY | Facility: CLINIC | Age: 84
End: 2024-06-06
Payer: MEDICARE

## 2024-06-06 NOTE — TELEPHONE ENCOUNTER
Called patient to reschedule appt due to Dr. BEAN being out on medical leave.  Hub can reschedule.

## 2024-06-25 RX ORDER — OMEPRAZOLE 40 MG/1
40 CAPSULE, DELAYED RELEASE ORAL DAILY
Qty: 90 CAPSULE | Refills: 1 | Status: SHIPPED | OUTPATIENT
Start: 2024-06-25

## 2024-07-05 ENCOUNTER — PREP FOR SURGERY (OUTPATIENT)
Dept: SURGERY | Facility: SURGERY CENTER | Age: 84
End: 2024-07-05
Payer: MEDICARE

## 2024-07-05 ENCOUNTER — OFFICE VISIT (OUTPATIENT)
Dept: GASTROENTEROLOGY | Facility: CLINIC | Age: 84
End: 2024-07-05
Payer: MEDICARE

## 2024-07-05 VITALS
TEMPERATURE: 97.8 F | OXYGEN SATURATION: 94 % | BODY MASS INDEX: 27.04 KG/M2 | HEIGHT: 70 IN | SYSTOLIC BLOOD PRESSURE: 110 MMHG | DIASTOLIC BLOOD PRESSURE: 70 MMHG | HEART RATE: 71 BPM | WEIGHT: 188.9 LBS

## 2024-07-05 DIAGNOSIS — K22.70 BARRETT'S ESOPHAGUS WITHOUT DYSPLASIA: ICD-10-CM

## 2024-07-05 DIAGNOSIS — K21.00 GASTROESOPHAGEAL REFLUX DISEASE WITH ESOPHAGITIS WITHOUT HEMORRHAGE: Primary | ICD-10-CM

## 2024-07-05 DIAGNOSIS — K21.00 GASTROESOPHAGEAL REFLUX DISEASE WITH ESOPHAGITIS, UNSPECIFIED WHETHER HEMORRHAGE: Primary | ICD-10-CM

## 2024-07-05 RX ORDER — SODIUM CHLORIDE 0.9 % (FLUSH) 0.9 %
10 SYRINGE (ML) INJECTION AS NEEDED
OUTPATIENT
Start: 2024-07-05

## 2024-07-05 RX ORDER — SODIUM CHLORIDE 0.9 % (FLUSH) 0.9 %
3 SYRINGE (ML) INJECTION EVERY 12 HOURS SCHEDULED
OUTPATIENT
Start: 2024-07-05

## 2024-07-05 RX ORDER — MEMANTINE HYDROCHLORIDE 5 MG/1
5 TABLET ORAL 2 TIMES DAILY
COMMUNITY

## 2024-07-05 RX ORDER — DIAZEPAM 10 MG/1
TABLET ORAL
COMMUNITY
Start: 2024-05-22

## 2024-07-05 RX ORDER — SODIUM CHLORIDE, SODIUM LACTATE, POTASSIUM CHLORIDE, CALCIUM CHLORIDE 600; 310; 30; 20 MG/100ML; MG/100ML; MG/100ML; MG/100ML
30 INJECTION, SOLUTION INTRAVENOUS CONTINUOUS PRN
OUTPATIENT
Start: 2024-07-05

## 2024-07-05 RX ORDER — GABAPENTIN 300 MG/1
300 CAPSULE ORAL EVERY EVENING
COMMUNITY
Start: 2024-06-25

## 2024-07-05 NOTE — PROGRESS NOTES
"Chief Complaint   Patient presents with    Heartburn           History of Present Illness    Patient is a 84 y.o. who presents to the office for follow up as a new patient to me.  Last in office visit was on 9/14/2023 with Dr. Rodriguez. Patient has a significant past medical history of GERD and Barretts esophagus.     For GERD, he continues on omeprazole 40 mg once daily, Carafate 1-2 times per day.  However prior to 2 weeks ago he had been taking omeprazole in the evening which was not beneficial in controlling upper abdominal burning sensation.  Since transitioning dosing to before first meal of the day symptoms have improved without nausea, vomiting, or dysphagia.     Describes \"gastritis discomfort\" as an upper abdominal sensation that is alleviated with food consumption and exacerbated by periods of fasting without known specific dietary trigger.    Denies lower GI concerns with bowel habits occurring regularly without evidence of melena or hematochezia.    Alcohol consumption is described as 2 drinks per day, denies NSAIDs or tobacco.  No unintentional weight loss.    Patient denies known family history of colon polyps, colon cancer, or IBD.       Result Review :         Office Visit with Kyle Rodriguez MD (09/14/2023)   Previous workup includes CT with evidence of hepatic hemangiomas and diverticulosis  HIDA scan 94%  GES-negative  Previous cholecystectomy secondary to chronic cholecystitis  UPPER GI ENDOSCOPY (08/23/2023 11:32) barretts gastritis tried FD jose  Urease For H Pylori - Tissue, Gastric, Antrum (08/23/2023 11:48)   Tissue Pathology Exam (08/23/2023 11:47)      UPPER GI ENDOSCOPY (07/13/2020 07:53) okoon esophagitis and gastritis  COLONOSCOPY (07/13/2020 07:52) patchy rectosigmoid coltitis  Tissue Pathology Exam (07/13/2020 08:01)   Vital Signs:   /70   Pulse 71   Temp 97.8 °F (36.6 °C)   Ht 177.8 cm (70\")   Wt 85.7 kg (188 lb 14.4 oz)   SpO2 94%   BMI 27.10 kg/m²     Body mass index " is 27.1 kg/m².     Physical Exam  Vitals reviewed.   Constitutional:       General: He is not in acute distress.     Appearance: Normal appearance. He is not ill-appearing.   Eyes:      General: No scleral icterus.  Pulmonary:      Effort: Pulmonary effort is normal. No respiratory distress.   Abdominal:      General: Bowel sounds are normal. There is no distension.      Palpations: Abdomen is soft. Abdomen is not rigid. There is no mass or pulsatile mass.      Tenderness: There is no abdominal tenderness. There is no guarding or rebound.      Hernia: No hernia is present.   Skin:     Coloration: Skin is not jaundiced.   Neurological:      Mental Status: He is alert and oriented to person, place, and time.   Psychiatric:         Thought Content: Thought content normal.         Judgment: Judgment normal.           Assessment and Plan    Diagnoses and all orders for this visit:    1. Gastroesophageal reflux disease with esophagitis without hemorrhage (Primary)    2. Mejía's esophagus without dysplasia           Discussion:    Patient is a pleasant 84-year-old male who presents today for management of GERD with Mejía's esophagus.  Discussed recommendations to continue omeprazole 40 mg once daily prior to first meal of the day and begin taking sucralfate 1 g prior to dinner and at bedtime in hopes of controlling upper GI discomfort.    He is agreeable to proceeding with 12-month follow-up EGD for surveillance of endoscopic evidence of Mejía's esophagus.  Future surveillance recommendations to be made based on upcoming EGD findings.  Confirmed these recommendations with GI attending Dr. Rodriguez with case request placed at time of today's visit.    He will follow-up within 2 to 4 weeks after undergoing endoscopic evaluation with  Further recommendations to be made pending results of the above work-up and clinical course.    Patient is agreeable to the outlined above treatment plan.  Verbalizes understanding and will  contact office for any new or worsening concerns.  All questions answered and support provided.        Patient Instructions   For GERD:    Follow antireflux precautions:    Continue omeprazole 40 mg once daily prior to first meal of the day   Sucralfate 1 g at 4 pm, then immediately before bedtime  Follow this regimen until we repeat you upper GI scope for monitoring of barretts esophagus- this will be due in August 2024    Stop taking pepto bismol and begin taking FD guard over the counter as needed for any return in symptoms instead   Avoiding eating within 3 to 4 hours of bedtime.    Avoid foods that can trigger symptoms which may include:  citrus fruits  spicy foods,  Tomatoes  Red sauces   Chocolate  coffee/tea  caffeinated or carbonated beverages  alcohol          EMR Dragon/Transcription Disclaimer:  This document has been Dictated utilizing Dragon dictation.

## 2024-07-05 NOTE — PATIENT INSTRUCTIONS
For GERD:    Follow antireflux precautions:    Continue omeprazole 40 mg once daily prior to first meal of the day   Sucralfate 1 g at 4 pm, then immediately before bedtime  Follow this regimen until we repeat you upper GI scope for monitoring of barretts esophagus- this will be due in August 2024    Stop taking pepto bismol and begin taking FD guard over the counter as needed for any return in symptoms instead   Avoiding eating within 3 to 4 hours of bedtime.    Avoid foods that can trigger symptoms which may include:  citrus fruits  spicy foods,  Tomatoes  Red sauces   Chocolate  coffee/tea  caffeinated or carbonated beverages  alcohol

## 2024-08-22 NOTE — SIGNIFICANT NOTE
Education provided the Patient on the following:    - Nothing to Eat or Drink after MN the night before the procedure  -You will need to have someone drive you home after your EGD and remain with you for 24 hours after the EGD  - The date of your EGD, your are welcome to have one visitor at bedside or remain within 10-15 minutes of [employer] [place]  -Please wear warm socks when you arrive for your EGD  -Remove all jewelry and leave any valuables before arriving on the date of your procedure (all will have to be removed before leaving preop)  -You will need to arrive at 1130 on 8-26-24 EGD  -Feel free to contact us at: [phone] with any additional questions/concerns

## 2024-08-26 ENCOUNTER — ANESTHESIA (OUTPATIENT)
Dept: SURGERY | Facility: SURGERY CENTER | Age: 84
End: 2024-08-26
Payer: MEDICARE

## 2024-08-26 ENCOUNTER — HOSPITAL ENCOUNTER (OUTPATIENT)
Facility: SURGERY CENTER | Age: 84
Setting detail: HOSPITAL OUTPATIENT SURGERY
Discharge: HOME | End: 2024-08-26
Attending: INTERNAL MEDICINE | Admitting: INTERNAL MEDICINE
Payer: MEDICARE

## 2024-08-26 ENCOUNTER — ANESTHESIA EVENT (OUTPATIENT)
Dept: SURGERY | Facility: SURGERY CENTER | Age: 84
End: 2024-08-26
Payer: MEDICARE

## 2024-08-26 VITALS
OXYGEN SATURATION: 96 % | BODY MASS INDEX: 28.76 KG/M2 | TEMPERATURE: 98.2 F | DIASTOLIC BLOOD PRESSURE: 96 MMHG | WEIGHT: 189.8 LBS | SYSTOLIC BLOOD PRESSURE: 134 MMHG | RESPIRATION RATE: 16 BRPM | HEIGHT: 68 IN | HEART RATE: 57 BPM

## 2024-08-26 DIAGNOSIS — K22.70 BARRETT'S ESOPHAGUS WITHOUT DYSPLASIA: ICD-10-CM

## 2024-08-26 DIAGNOSIS — K21.00 GASTROESOPHAGEAL REFLUX DISEASE WITH ESOPHAGITIS, UNSPECIFIED WHETHER HEMORRHAGE: ICD-10-CM

## 2024-08-26 PROCEDURE — 88305 TISSUE EXAM BY PATHOLOGIST: CPT | Performed by: INTERNAL MEDICINE

## 2024-08-26 PROCEDURE — 43239 EGD BIOPSY SINGLE/MULTIPLE: CPT | Performed by: INTERNAL MEDICINE

## 2024-08-26 PROCEDURE — 25010000002 PROPOFOL 200 MG/20ML EMULSION: Performed by: STUDENT IN AN ORGANIZED HEALTH CARE EDUCATION/TRAINING PROGRAM

## 2024-08-26 PROCEDURE — 25810000003 LACTATED RINGERS PER 1000 ML: Performed by: STUDENT IN AN ORGANIZED HEALTH CARE EDUCATION/TRAINING PROGRAM

## 2024-08-26 PROCEDURE — 25010000002 PROPOFOL 10 MG/ML EMULSION: Performed by: STUDENT IN AN ORGANIZED HEALTH CARE EDUCATION/TRAINING PROGRAM

## 2024-08-26 RX ORDER — SODIUM CHLORIDE, SODIUM LACTATE, POTASSIUM CHLORIDE, CALCIUM CHLORIDE 600; 310; 30; 20 MG/100ML; MG/100ML; MG/100ML; MG/100ML
30 INJECTION, SOLUTION INTRAVENOUS CONTINUOUS PRN
Status: DISCONTINUED | OUTPATIENT
Start: 2024-08-26 | End: 2024-08-26 | Stop reason: HOSPADM

## 2024-08-26 RX ORDER — SODIUM CHLORIDE 0.9 % (FLUSH) 0.9 %
3 SYRINGE (ML) INJECTION EVERY 12 HOURS SCHEDULED
Status: DISCONTINUED | OUTPATIENT
Start: 2024-08-26 | End: 2024-08-26 | Stop reason: HOSPADM

## 2024-08-26 RX ORDER — PROPOFOL 10 MG/ML
INJECTION, EMULSION INTRAVENOUS AS NEEDED
Status: DISCONTINUED | OUTPATIENT
Start: 2024-08-26 | End: 2024-08-26 | Stop reason: SURG

## 2024-08-26 RX ORDER — SODIUM CHLORIDE, SODIUM LACTATE, POTASSIUM CHLORIDE, CALCIUM CHLORIDE 600; 310; 30; 20 MG/100ML; MG/100ML; MG/100ML; MG/100ML
9 INJECTION, SOLUTION INTRAVENOUS CONTINUOUS
Status: DISCONTINUED | OUTPATIENT
Start: 2024-08-26 | End: 2024-08-26 | Stop reason: HOSPADM

## 2024-08-26 RX ORDER — SODIUM CHLORIDE 0.9 % (FLUSH) 0.9 %
3-10 SYRINGE (ML) INJECTION AS NEEDED
Status: DISCONTINUED | OUTPATIENT
Start: 2024-08-26 | End: 2024-08-26 | Stop reason: HOSPADM

## 2024-08-26 RX ORDER — SODIUM CHLORIDE, SODIUM LACTATE, POTASSIUM CHLORIDE, CALCIUM CHLORIDE 600; 310; 30; 20 MG/100ML; MG/100ML; MG/100ML; MG/100ML
INJECTION, SOLUTION INTRAVENOUS CONTINUOUS PRN
Status: DISCONTINUED | OUTPATIENT
Start: 2024-08-26 | End: 2024-08-26 | Stop reason: SURG

## 2024-08-26 RX ORDER — SODIUM CHLORIDE 0.9 % (FLUSH) 0.9 %
10 SYRINGE (ML) INJECTION AS NEEDED
Status: DISCONTINUED | OUTPATIENT
Start: 2024-08-26 | End: 2024-08-26 | Stop reason: HOSPADM

## 2024-08-26 RX ORDER — DEXTROSE MONOHYDRATE 25 G/50ML
12.5 INJECTION, SOLUTION INTRAVENOUS AS NEEDED
Status: DISCONTINUED | OUTPATIENT
Start: 2024-08-26 | End: 2024-08-26 | Stop reason: HOSPADM

## 2024-08-26 RX ORDER — LIDOCAINE HYDROCHLORIDE 20 MG/ML
INJECTION, SOLUTION EPIDURAL; INFILTRATION; INTRACAUDAL; PERINEURAL AS NEEDED
Status: DISCONTINUED | OUTPATIENT
Start: 2024-08-26 | End: 2024-08-26 | Stop reason: SURG

## 2024-08-26 RX ADMIN — PROPOFOL 200 MCG/KG/MIN: 10 INJECTION, EMULSION INTRAVENOUS at 13:05

## 2024-08-26 RX ADMIN — SODIUM CHLORIDE, POTASSIUM CHLORIDE, SODIUM LACTATE AND CALCIUM CHLORIDE: 600; 310; 30; 20 INJECTION, SOLUTION INTRAVENOUS at 13:02

## 2024-08-26 RX ADMIN — PROPOFOL 50 MG: 10 INJECTION, EMULSION INTRAVENOUS at 13:05

## 2024-08-26 RX ADMIN — LIDOCAINE HYDROCHLORIDE 100 MG: 20 INJECTION, SOLUTION EPIDURAL; INFILTRATION; INTRACAUDAL; PERINEURAL at 13:05

## 2024-08-26 RX ADMIN — SODIUM CHLORIDE, POTASSIUM CHLORIDE, SODIUM LACTATE AND CALCIUM CHLORIDE 9 ML/HR: 600; 310; 30; 20 INJECTION, SOLUTION INTRAVENOUS at 12:22

## 2024-08-26 NOTE — ANESTHESIA PREPROCEDURE EVALUATION
Anesthesia Evaluation     Patient summary reviewed and Nursing notes reviewed   no history of anesthetic complications:   NPO Solid Status: > 8 hours  NPO Liquid Status: > 2 hours           Airway   Dental      Pulmonary    Cardiovascular     (+) hypertension, CAD    ROS comment: Echo unremarkable    Neuro/Psych  GI/Hepatic/Renal/Endo    (+) GERD, thyroid problem hypothyroidism    Musculoskeletal     Abdominal    Substance History      OB/GYN          Other   arthritis,                       Anesthesia Plan    ASA 3     MAC     intravenous induction     Anesthetic plan, risks, benefits, and alternatives have been provided, discussed and informed consent has been obtained with: patient.        CODE STATUS:

## 2024-08-26 NOTE — DISCHARGE INSTRUCTIONS
ENDOSCOPY - EGD/COLONOSCOPY       ADULT CARE DISCHARGE  INSTRUCTIONS     Discharge  Diet:     Avoid spicy/ greasy foods     Avoid any food that will cause more gas or bloating     Symptoms you may temporarily experience:      Sore Throat     Hoarseness     Bloating/Cramping     Dizziness     IV Irritation/tenderness     Gas or Belching     Slight fever     Small amount of blood in vomit or stool       Call Your Doctor for the following Problems: DR. ARRINGTON [phone]    Fever of 101 degrees or higher       Sharp abdominal  pain     Red streak up the arm from the IV site     Severe cramping        Large amount of blood in stool or vomit       Instructions for the next 24 hours after your Procedure:     Adult supervision     Do NOT drink any alcohol      Do not work today     NO important decisions     DO NOT sign any legal documents     You may shower/ bathe       DO NOT  DRIVE or operate machinery     Resume normal activity tomorrow     *** Seek IMMEDIATE medical attention and call 911 if you develop symptoms such as:     Chest pain     Shortness of breath     Severe bleeding

## 2024-08-26 NOTE — H&P
No chief complaint on file.      HPI  Gerd  barretts         Problem List:    Patient Active Problem List   Diagnosis    Epigastric pain    Diarrhea    Colitis    Abnormal CT scan, sigmoid colon    Spondylosis without myelopathy    Early satiety    Proteins serum plasma low    Abnormal magnetic resonance imaging of head    Anxiety    Benign essential hypertension    Benign neoplasm of colon    Bilateral high frequency sensorineural hearing loss    Blood glucose abnormal    Blood in urine    Chest pain    Contusion of chest    Coronary artery disease involving native coronary artery of native heart without angina pectoris    COVID-19    Depressive disorder    Diverticular disease of colon    Dyslipidemia    ED (erectile dysfunction) of organic origin    Elevated LFTs    Extremity pain    Fatigue    MONTSERRAT (generalized anxiety disorder)    Gastroesophageal reflux disease    Hammer toe    History of skin cancer    Hypogonadism    Hypothyroidism    Immunodeficiency disorder    Impacted cerumen    Injury of foot    Kidney cysts    Laceration    Left lower lobe pneumonia    Low blood pressure    Low testosterone in male    Pain in the coccyx    Malignant neoplasm of skin    Mitral valve disorder    Mixed hyperlipidemia    Myositis    Non-seasonal allergic rhinitis    Olecranon bursitis of left elbow    Pain of left thumb    Prediabetes    Primary malignant neoplasm of prostate    Pulmonary hypertension    Renal failure syndrome    Renal function test abnormal    Seasonal allergies    Secondary insomnia    Testicular hypofunction    Tinnitus of both ears    Urinary tract infectious disease    Wound of skin    Chronic constipation    Chronic esophagitis    Irritable bowel syndrome    Parkinson's disease    Gastritis without bleeding    DDD (degenerative disc disease), lumbar    Spinal stenosis of lumbar region without neurogenic claudication    Chronic sacroiliac pain    Mejía's esophagus without dysplasia       Medical  History:    Past Medical History:   Diagnosis Date    Anxiety     Arthritis     Mejía's esophagus without dysplasia 2024    Depression     Disease of thyroid gland     Hyperlipidemia     Hypertension     Prostate cancer         Social History:    Social History     Socioeconomic History    Marital status:    Tobacco Use    Smoking status: Former     Current packs/day: 0.00     Average packs/day: 1 pack/day for 12.0 years (12.0 ttl pk-yrs)     Types: Cigarettes     Start date: 1958     Quit date: 1970     Years since quittin.6    Smokeless tobacco: Never   Vaping Use    Vaping status: Never Used   Substance and Sexual Activity    Alcohol use: Yes     Comment: 2 drinks daily    Drug use: No    Sexual activity: Not Currently     Partners: Female       Family History:   Family History   Problem Relation Age of Onset    No Known Problems Mother     Arthritis Father     Cancer Father     Heart disease Father     Parkinsonism Father     No Known Problems Maternal Grandmother     No Known Problems Maternal Grandfather     No Known Problems Paternal Grandmother     No Known Problems Paternal Grandfather     Colon cancer Neg Hx     Colon polyps Neg Hx     Irritable bowel syndrome Neg Hx     Ulcerative colitis Neg Hx     Crohn's disease Neg Hx        Surgical History:   Past Surgical History:   Procedure Laterality Date    CHOLECYSTECTOMY  9/15/2022    COLONOSCOPY N/A 2020    Procedure: COLONOSCOPY TO CECUM with bx;  Surgeon: Dinesh Starr MD;  Location: [facility] ENDOSCOPY;  Service: General;  Laterality: N/A;  PREOP/ melena, diarrhea  POSTOP/ diverticulosis, non specific colitis, proctocolitis    ENDOSCOPY N/A 2020    Procedure: ESOPHAGOGASTRODUODENOSCOPY WITH BIOPSY;  Surgeon: Dinesh Starr MD;  Location: [facility] ENDOSCOPY;  Service: General;  Laterality: N/A;  PREOP/ abdominal pain  POSTOP/ gastritis, duodenitis, esophagitis    ENDOSCOPY N/A 2023    Procedure:  ESOPHAGOGASTRODUODENOSCOPY with biopsy;  Surgeon: Kyle Rodriguez MD;  [place] [facility] [place];  Service: Gastroenterology;  Laterality: N/A;  barretts, gastritis    PROSTATECTOMY      TONSILLECTOMY      UPPER GASTROINTESTINAL ENDOSCOPY  9/15/2022       No current facility-administered medications for this encounter.    Current Outpatient Medications:     ALPRAZolam (XANAX) 0.5 MG tablet, alprazolam 0.5 mg tablet  TAKE 1 TABLET EVERY DAY FOR ANXIETY ATTACK, Disp: , Rfl:     ammonium lactate (LAC-HYDRIN) 12 % lotion, , Disp: , Rfl:     aspirin 81 MG EC tablet, 81 mg by oral route., Disp: , Rfl:     carvedilol (COREG) 6.25 MG tablet, Take 1 tablet by mouth 2 (Two) Times a Day., Disp: , Rfl: 2    citalopram (CeleXA) 20 MG tablet, 1 tablet., Disp: , Rfl:     ezetimibe (ZETIA) 10 MG tablet, 1 tablet., Disp: , Rfl:     fluticasone (FLONASE) 50 MCG/ACT nasal spray, SPRAYS 2 SPRAYS INTO NOSTRIL TWICE DAILY FOR 10 DAYS, Disp: , Rfl:     levocetirizine (XYZAL) 5 MG tablet, 1 tablet., Disp: , Rfl:     levothyroxine (SYNTHROID, LEVOTHROID) 50 MCG tablet, Take  by mouth., Disp: , Rfl:     lisinopril (PRINIVIL,ZESTRIL) 20 MG tablet, Take 1 tablet by mouth 2 (Two) Times a Day., Disp: , Rfl:     omeprazole (priLOSEC) 40 MG capsule, TAKE 1 CAPSULE BY MOUTH EVERY DAY, Disp: 90 capsule, Rfl: 1    rosuvastatin (CRESTOR) 40 MG tablet, Take 1 tablet by mouth Daily., Disp: , Rfl:     sucralfate (CARAFATE) 1 g tablet, TAKE 1 TABLET BY MOUTH TWICE A DAY, Disp: 60 tablet, Rfl: 2    Testosterone 20.25 MG/ACT (1.62%) gel, APPLY 2 PUMPS TO SKIN DAILY, Disp: , Rfl:     traMADol (ULTRAM) 50 MG tablet, tramadol 50 mg tablet  Take 2 tablets 3 times a day by oral route., Disp: , Rfl:     Allergies:   Allergies   Allergen Reactions    Dust Mite Extract     Grass Pollen(K-O-R-T-Swt Akira) Unknown - Low Severity    Molds & Smuts Other (See Comments)        The following portions of the patient's history were reviewed by me and updated as appropriate:  review of systems, allergies, current medications, past family history, past medical history, past social history, past surgical history and problem list.    There were no vitals filed for this visit.    PHYSICAL EXAM:    CONSTITUTIONAL:  today's vital signs reviewed by me  GASTROINTESTINAL: abdomen is soft nontender nondistended with normal active bowel sounds, no masses are appreciated    Assessment/ Plan  Gerd  Barretts    egd    Risks and benefits as well as alternatives to endoscopic evaluation were explained to the patient and they voiced understanding and wish to proceed.  These risks include but are not limited to the risk of bleeding, perforation, adverse reaction to sedation, and missed lesions.  The patient was given the opportunity to ask questions prior to the endoscopic procedure.

## 2024-08-27 LAB
CYTO UR: NORMAL
LAB AP CASE REPORT: NORMAL
PATH REPORT.FINAL DX SPEC: NORMAL
PATH REPORT.GROSS SPEC: NORMAL

## 2024-09-17 ENCOUNTER — TRANSCRIBE ORDERS (OUTPATIENT)
Dept: ADMINISTRATIVE | Facility: HOSPITAL | Age: 84
End: 2024-09-17
Payer: MEDICARE

## 2024-09-17 DIAGNOSIS — M51.36 DEGENERATION OF LUMBAR INTERVERTEBRAL DISC: Primary | ICD-10-CM

## 2024-10-02 ENCOUNTER — TELEPHONE (OUTPATIENT)
Dept: NEUROSURGERY | Facility: CLINIC | Age: 84
End: 2024-10-02

## 2024-10-07 ENCOUNTER — OFFICE VISIT (OUTPATIENT)
Dept: NEUROSURGERY | Facility: CLINIC | Age: 84
End: 2024-10-07
Payer: MEDICARE

## 2024-10-07 VITALS
WEIGHT: 189 LBS | SYSTOLIC BLOOD PRESSURE: 118 MMHG | BODY MASS INDEX: 28.64 KG/M2 | RESPIRATION RATE: 20 BRPM | HEIGHT: 68 IN | DIASTOLIC BLOOD PRESSURE: 78 MMHG

## 2024-10-07 DIAGNOSIS — M48.061 SPINAL STENOSIS OF LUMBAR REGION WITHOUT NEUROGENIC CLAUDICATION: ICD-10-CM

## 2024-10-07 DIAGNOSIS — M51.362 DEGENERATION OF INTERVERTEBRAL DISC OF LUMBAR REGION WITH DISCOGENIC BACK PAIN AND LOWER EXTREMITY PAIN: Primary | ICD-10-CM

## 2024-10-07 DIAGNOSIS — G89.29 CHRONIC SACROILIAC PAIN: ICD-10-CM

## 2024-10-07 DIAGNOSIS — M53.3 CHRONIC SACROILIAC PAIN: ICD-10-CM

## 2024-10-07 PROCEDURE — 3074F SYST BP LT 130 MM HG: CPT | Performed by: NEUROLOGICAL SURGERY

## 2024-10-07 PROCEDURE — 1160F RVW MEDS BY RX/DR IN RCRD: CPT | Performed by: NEUROLOGICAL SURGERY

## 2024-10-07 PROCEDURE — 1159F MED LIST DOCD IN RCRD: CPT | Performed by: NEUROLOGICAL SURGERY

## 2024-10-07 PROCEDURE — 3078F DIAST BP <80 MM HG: CPT | Performed by: NEUROLOGICAL SURGERY

## 2024-10-07 PROCEDURE — 99213 OFFICE O/P EST LOW 20 MIN: CPT | Performed by: NEUROLOGICAL SURGERY

## 2024-10-07 NOTE — PROGRESS NOTES
Subjective   Patient ID: Willy Saenz is a 84 y.o. male is here today for follow-up.    I last saw this gentleman about 10 months ago.  He recently got another opinion from Dr. Massey neurosurgeon in [place] who agreed with me that none surgical treatment options were best.  He still works with Dr. Donato and gets ablations twice a year, transforaminal epidural blocks.  He still has not get any sacroiliac injections according to him.  I suggested that last time and I told him to speak with Dr. Donato about this.  He does have a little bit more radicular pain down the left side compared to last time.  Is not absolutely excruciating but it does keep him up at night occasionally.  He still golfs and he still exercises.  He tried yoga but did not like it.  I still suggested that he try some Pilates.  I think Dr. Almeida his primary care physician can try either Lyrica or higher doses of gabapentin which was ineffective at a lower dose before giving up on the medicine approach.  That might help him at night and help him to sleep better.  But I definitely do not think he needs surgical intervention.  He does have some structural problems but he is still functional He to do something to him like a multilevel fusion that might make him less functional rather than more functional.  Will continue to follow him and we will see him in 1 year with x-rays.    History of Present Illness    The following portions of the patient's history were reviewed and updated as appropriate: allergies, current medications, past family history, past medical history, past social history, past surgical history, and problem list.    Review of Systems   Constitutional:  Negative for fever.   Musculoskeletal:  Positive for back pain (left leg). Negative for gait problem.   Neurological:  Negative for dizziness, weakness, light-headedness, numbness and headaches.   All other systems reviewed and are negative.          Objective     Vitals:     "10/07/24 1408   BP: 118/78   Resp: 20   Weight: 85.7 kg (189 lb)   Height: 172.7 cm (67.99\")   PainSc:   1   PainLoc: Back     Body mass index is 28.74 kg/m².    Tobacco Use: Medium Risk (10/7/2024)    Patient History     Smoking Tobacco Use: Former     Smokeless Tobacco Use: Never     Passive Exposure: Not on file          Physical Exam  Constitutional:       Appearance: He is well-developed.   HENT:      Head: Normocephalic and atraumatic.   Eyes:      Extraocular Movements: EOM normal.      Conjunctiva/sclera: Conjunctivae normal.      Pupils: Pupils are equal, round, and reactive to light.   Neck:      Vascular: No carotid bruit.   Neurological:      Mental Status: He is oriented to person, place, and time.      Coordination: Finger-Nose-Finger Test and Heel to Shin Test normal.      Gait: Gait is intact.      Deep Tendon Reflexes:      Reflex Scores:       Tricep reflexes are 2+ on the right side and 2+ on the left side.       Bicep reflexes are 2+ on the right side and 2+ on the left side.       Brachioradialis reflexes are 2+ on the right side and 2+ on the left side.       Patellar reflexes are 2+ on the right side and 2+ on the left side.       Achilles reflexes are 2+ on the right side and 2+ on the left side.  Psychiatric:         Speech: Speech normal.       Neurologic Exam     Mental Status   Oriented to person, place, and time.   Registration of memory: Good recent and remote memory.   Attention: normal. Concentration: normal.   Speech: speech is normal   Level of consciousness: alert  Knowledge: consistent with education.     Cranial Nerves     CN II   Visual fields full to confrontation.   Visual acuity: normal    CN III, IV, VI   Pupils are equal, round, and reactive to light.  Extraocular motions are normal.     CN V   Facial sensation intact.   Right corneal reflex: normal  Left corneal reflex: normal    CN VII   Facial expression full, symmetric.   Right facial weakness: none  Left facial " weakness: none    CN VIII   Hearing: intact    CN IX, X   Palate: symmetric    CN XI   Right sternocleidomastoid strength: normal  Left sternocleidomastoid strength: normal    CN XII   Tongue: not atrophic  Tongue deviation: none    Motor Exam   Muscle bulk: normal  Right arm tone: normal  Left arm tone: normal  Right leg tone: normal  Left leg tone: normal    Strength   Strength 5/5 except as noted.     Sensory Exam   Light touch normal.     Gait, Coordination, and Reflexes     Gait  Gait: normal    Coordination   Finger to nose coordination: normal  Heel to shin coordination: normal    Reflexes   Right brachioradialis: 2+  Left brachioradialis: 2+  Right biceps: 2+  Left biceps: 2+  Right triceps: 2+  Left triceps: 2+  Right patellar: 2+  Left patellar: 2+  Right achilles: 2+  Left achilles: 2+  Right : 2+  Left : 2+          Assessment & Plan   Independent Review of Radiographic Studies:      I personally reviewed the images from the following studies.    The new MRI done on 9/21/2024 was reviewed.  There is multiple levels of disc space collapse at L2-L3, L3-L4, L4-L5 with lateral recess stenosis.  I do not think it is significantly progressed since his MRI done in August 2023.  Agree with the report.        Medical Decision Making:      Again, he is quite functional and I do not think surgical intervention is the appropriate thing.  I told him to again speak with Dr. Donato about trying some sacroiliac injections particularly on the left.  I told him to talk to Dr. Almeida about trying Lyrica or higher doses of gabapentin to help with his leg and ankle pain at night which I think is coming from his spine.  Will see him in 1 year with x-rays, sooner if there is a significant downturn.    Diagnoses and all orders for this visit:    1. Degeneration of intervertebral disc of lumbar region with discogenic back pain and lower extremity pain (Primary)  -     XR Spine Lumbar Complete With Flex & Ext; Future    2.  Spinal stenosis of lumbar region without neurogenic claudication  -     XR Spine Lumbar Complete With Flex & Ext; Future    3. Chronic sacroiliac pain  -     XR Spine Lumbar Complete With Flex & Ext; Future      Return in about 1 year (around 10/7/2025) for Face-to-face.

## 2025-01-27 RX ORDER — OMEPRAZOLE 40 MG/1
40 CAPSULE, DELAYED RELEASE ORAL DAILY
Qty: 90 CAPSULE | Refills: 1 | Status: SHIPPED | OUTPATIENT
Start: 2025-01-27

## 2025-04-18 ENCOUNTER — TELEPHONE (OUTPATIENT)
Dept: NEUROSURGERY | Facility: CLINIC | Age: 85
End: 2025-04-18

## 2025-05-12 NOTE — PROGRESS NOTES
RM:__________                    PCP:Almeida, Willy Coco, MD                                       Last EKG:    :_3/                                                                    AGE:85 y.o.      REASON FOR VISIT:____________________________________________________________________________        WT:____________    HT:____________   BP:____________  HR:____________       SMOKER: CURRENT/PPD:___________________ FORMER:______________ NEVER:______________          RECENT HOSPITALIZATIONS OR   ER VISITS:________________________________________________________

## 2025-05-13 ENCOUNTER — OFFICE VISIT (OUTPATIENT)
Dept: CARDIOLOGY | Age: 85
End: 2025-05-13
Payer: MEDICARE

## 2025-05-13 VITALS
SYSTOLIC BLOOD PRESSURE: 124 MMHG | WEIGHT: 184.4 LBS | HEART RATE: 67 BPM | BODY MASS INDEX: 26.4 KG/M2 | HEIGHT: 70 IN | DIASTOLIC BLOOD PRESSURE: 76 MMHG

## 2025-05-13 DIAGNOSIS — I48.0 PAF (PAROXYSMAL ATRIAL FIBRILLATION): Primary | ICD-10-CM

## 2025-05-13 DIAGNOSIS — I10 ESSENTIAL HYPERTENSION: ICD-10-CM

## 2025-05-13 RX ORDER — FEXOFENADINE HCL 180 MG/1
180 TABLET ORAL DAILY
COMMUNITY
Start: 2025-05-02

## 2025-05-13 RX ORDER — ESOMEPRAZOLE MAGNESIUM 40 MG/1
40 CAPSULE, DELAYED RELEASE ORAL
COMMUNITY

## 2025-05-13 NOTE — PROGRESS NOTES
[facility]      Patient Name: Willy Saenz  :1940  Age: 85 y.o.  Encounter Provider:  Gianfranco Kaur Jr, MD      Chief Complaint:   Chief Complaint   Patient presents with    Atrial Fibrillation         HPI  Willy Saenz is a 85 y.o. male past medical history of hypertension, PAF presents for initial evaluation.  Previously seen by Dr. Medina for chest discomfort with a negative stress study back in .  He was in usual state of health until October when he began experiencing chest discomfort and dizziness.  He was ultimately diagnosed with atrial fibrillation.  He was hospitalized for atrial fibrillation with RVR and had cardioversion in early December.  About 2 weeks later he had PVI ablation.  No sign of recurrence since then.  He never had palpitations but the one sign that he complained of was dizziness.  He has not had any since the ablation.  He denies angina.  No orthopnea, PND or edema.  No episodes of palpitations or syncope.      The following portions of the patient's history were reviewed and updated as appropriate: allergies, current medications, past family history, past medical history, past social history, past surgical history and problem list.      Review of Systems   Constitutional: Negative for chills and fever.   HENT:  Negative for hoarse voice and sore throat.    Eyes:  Negative for double vision and photophobia.   Cardiovascular:  Negative for chest pain, leg swelling, near-syncope, orthopnea, palpitations, paroxysmal nocturnal dyspnea and syncope.   Respiratory:  Negative for cough and wheezing.    Skin:  Negative for poor wound healing and rash.   Musculoskeletal:  Negative for arthritis and joint swelling.   Gastrointestinal:  Negative for bloating, abdominal pain, hematemesis and hematochezia.   Neurological:  Negative for dizziness and focal weakness.   Psychiatric/Behavioral:  Negative for depression and suicidal ideas.        OBJECTIVE:   Vital  "Signs  Vitals:    05/13/25 1013   BP: 124/76   Pulse: 67     Estimated body mass index is 26.46 kg/m² as calculated from the following:    Height as of this encounter: 177.8 cm (70\").    Weight as of this encounter: 83.6 kg (184 lb 6.4 oz).    Vitals reviewed.   Constitutional:       Appearance: Healthy appearance. Not in distress.   Neck:      Vascular: No JVR. JVD normal.   Pulmonary:      Effort: Pulmonary effort is normal.      Breath sounds: Normal breath sounds. No wheezing. No rhonchi. No rales.   Chest:      Chest wall: Not tender to palpatation.   Cardiovascular:      PMI at left midclavicular line. Normal rate. Regular rhythm. Normal S1. Normal S2.       Murmurs: There is no murmur.      No gallop.  No click. No rub.   Pulses:     Intact distal pulses.   Edema:     Peripheral edema absent.   Abdominal:      General: Bowel sounds are normal.      Palpations: Abdomen is soft.      Tenderness: There is no abdominal tenderness.   Musculoskeletal: Normal range of motion.         General: No tenderness. Skin:     General: Skin is warm and dry.   Neurological:      General: No focal deficit present.      Mental Status: Alert and oriented to person, place and time.           ECG 12 Lead    Date/Time: 5/13/2025 10:45 AM  Performed by: Gianfranco Kaur Jr., MD    Authorized by: Gianfranco Kaur Jr., MD  Comparison: compared with previous ECG from 7/24/2022  Similar to previous ECG  Rhythm: sinus rhythm    Clinical impression: normal ECG               BUN   Date Value Ref Range Status   07/24/2022 24 (H) 8 - 23 mg/dL Final     Creatinine   Date Value Ref Range Status   07/24/2022 1.28 (H) 0.76 - 1.27 mg/dL Final   07/06/2022 1.50 (H) 0.60 - 1.30 mg/dL Final     Comment:     Serial Number: [id number]:  [id number]     Potassium   Date Value Ref Range Status   07/24/2022 4.6 3.5 - 5.2 mmol/L Final     ALT (SGPT)   Date Value Ref Range Status   07/24/2022 29 1 - 41 U/L Final     AST (SGOT)   Date Value Ref Range Status "   07/24/2022 26 1 - 40 U/L Final           ASSESSMENT:     85-year-old male presents for initial evaluation of PAF      PLAN OF CARE:     PAF - status post PVI ablation.  No evidence of recurrence.  No bleeding complications on Eliquis.  Heart rate is well-controlled not currently on negative chronotropic therapy.  Monitor clinical progress.  He had echocardiogram that showed normal EF with no significant valvular heart disease.  Essential hypertension -seems well-controlled on single agent therapy.  Sodium restricted diet.  Blood pressure log from home shows things are well-controlled at this time.  Mixed hyperlipidemia    Return to clinic 12 months         Discharge Medications            Accurate as of May 13, 2025 10:15 AM. If you have any questions, ask your nurse or doctor.                Continue These Medications        Instructions Start Date   ALIGN PO   Oral, Daily      ALPRAZolam 0.5 MG tablet  Commonly known as: XANAX   alprazolam 0.5 mg tablet   TAKE 1 TABLET EVERY DAY FOR ANXIETY ATTACK      ammonium lactate 12 % lotion  Commonly known as: LAC-HYDRIN   No dose, route, or frequency recorded.      apixaban 5 MG tablet tablet  Commonly known as: ELIQUIS   5 mg, 2 Times Daily      aspirin 81 MG EC tablet   81 mg by oral route.      carvedilol 6.25 MG tablet  Commonly known as: COREG   6.25 mg, 2 Times Daily      citalopram 20 MG tablet  Commonly known as: CeleXA   1 tablet      esomeprazole 40 MG capsule  Commonly known as: nexIUM   40 mg, Every Morning Before Breakfast      ezetimibe 10 MG tablet  Commonly known as: ZETIA   1 tablet      fexofenadine 180 MG tablet  Commonly known as: ALLEGRA   180 mg, Daily      fluticasone 50 MCG/ACT nasal spray  Commonly known as: FLONASE   SPRAYS 2 SPRAYS INTO NOSTRIL TWICE DAILY FOR 10 DAYS      levocetirizine 5 MG tablet  Commonly known as: XYZAL   1 tablet      levothyroxine 50 MCG tablet  Commonly known as: SYNTHROID, LEVOTHROID   Take  by mouth.      lisinopril  20 MG tablet  Commonly known as: PRINIVIL,ZESTRIL   20 mg, 2 Times Daily      omeprazole 40 MG capsule  Commonly known as: priLOSEC   40 mg, Oral, Daily      rosuvastatin 40 MG tablet  Commonly known as: CRESTOR   40 mg, Daily      Testosterone 20.25 MG/ACT (1.62%) gel   APPLY 2 PUMPS TO SKIN DAILY      traMADol 50 MG tablet  Commonly known as: ULTRAM   tramadol 50 mg tablet   Take 2 tablets 3 times a day by oral route.               Thank you for allowing me to participate in the care of your patient,      Sincerely,   Gianfranco Kaur MD  [facility]  05/13/25  10:15 EDT

## 2025-08-04 RX ORDER — SUCRALFATE 1 G/1
1 TABLET ORAL EVERY 12 HOURS SCHEDULED
Qty: 180 TABLET | Refills: 0 | Status: SHIPPED | OUTPATIENT
Start: 2025-08-04

## (undated) DEVICE — BITEBLOCK OMNI BLOC

## (undated) DEVICE — GOWN PROC ENDOARMOR GI LVL3 HY/SHLD UNIV

## (undated) DEVICE — FLEX ADVANTAGE 1500CC: Brand: FLEX ADVANTAGE

## (undated) DEVICE — FRCP BX RADJAW4 NDL 2.8 240CM LG OG BX40

## (undated) DEVICE — LAB CORP CLOTEST UREASE

## (undated) DEVICE — CANN O2 ETCO2 FITS ALL CONN CO2 SMPL A/ 7IN DISP LF

## (undated) DEVICE — BLCK/BITE BLOX W/DENTL/RIM W/STRAP 54F

## (undated) DEVICE — VIAL FORMLN CAP 10PCT 20ML

## (undated) DEVICE — SINGLE-USE BIOPSY FORCEPS: Brand: RADIAL JAW 4

## (undated) DEVICE — MSK ENDO PORT O2 POM ELITE CURAPLEX A/

## (undated) DEVICE — TUBING, SUCTION, 1/4" X 10', STRAIGHT: Brand: MEDLINE

## (undated) DEVICE — ADAPT CLN BIOGUARD AIR/H2O DISP

## (undated) DEVICE — SENSR O2 OXIMAX FNGR A/ 18IN NONSTR

## (undated) DEVICE — ADAPT CLN SCPE ENDO PORPOISE BX/50 DISP

## (undated) DEVICE — Device

## (undated) DEVICE — THE TORRENT IRRIGATION SCOPE CONNECTOR IS USED WITH THE TORRENT IRRIGATION TUBING TO PROVIDE IRRIGATION FLUIDS SUCH AS STERILE WATER DURING GASTROINTESTINAL ENDOSCOPIC PROCEDURES WHEN USED IN CONJUNCTION WITH AN IRRIGATION PUMP (OR ELECTROSURGICAL UNIT).: Brand: TORRENT

## (undated) DEVICE — KT ORCA ORCAPOD DISP STRL

## (undated) DEVICE — LN SMPL CO2 SHTRM SD STREAM W/M LUER